# Patient Record
Sex: FEMALE | Race: WHITE | NOT HISPANIC OR LATINO | Employment: OTHER | URBAN - METROPOLITAN AREA
[De-identification: names, ages, dates, MRNs, and addresses within clinical notes are randomized per-mention and may not be internally consistent; named-entity substitution may affect disease eponyms.]

---

## 2017-02-15 ENCOUNTER — HISTORICAL (OUTPATIENT)
Dept: ADMINISTRATIVE | Facility: HOSPITAL | Age: 32
End: 2017-02-15

## 2017-02-15 LAB
ALBUMIN SERPL-MCNC: 4.3 G/DL (ref 3.1–4.7)
ALP SERPL-CCNC: 69 IU/L (ref 40–104)
ALT (SGPT): 28 IU/L (ref 3–33)
AST SERPL-CCNC: 21 IU/L (ref 10–40)
BASOPHILS NFR BLD: 0.1 K/UL (ref 0–0.2)
BASOPHILS NFR BLD: 0.6 %
BILIRUB SERPL-MCNC: 0.6 MG/DL (ref 0.3–1)
BUN SERPL-MCNC: 14 MG/DL (ref 8–20)
CALCIUM SERPL-MCNC: 9.2 MG/DL (ref 7.7–10.4)
CHLORIDE: 102 MMOL/L (ref 98–110)
CO2 SERPL-SCNC: 25.3 MMOL/L (ref 22.8–31.6)
CREATININE: 0.78 MG/DL (ref 0.6–1.4)
CRP SERPL-MCNC: 0.36 MG/DL (ref 0–1.4)
EOSINOPHIL NFR BLD: 0.1 K/UL (ref 0–0.7)
EOSINOPHIL NFR BLD: 1 %
ERYTHROCYTE [DISTWIDTH] IN BLOOD BY AUTOMATED COUNT: 12.8 % (ref 11.7–14.9)
GLUCOSE: 104 MG/DL (ref 70–99)
GRAN #: 4.8 K/UL (ref 1.4–6.5)
GRAN%: 58.8 %
HCT VFR BLD AUTO: 42 % (ref 36–48)
HGB BLD-MCNC: 14.1 G/DL (ref 12–15)
IMMATURE GRANS (ABS): 0 K/UL (ref 0–1)
IMMATURE GRANULOCYTES: 0.5 %
LYMPH #: 2.8 K/UL (ref 1.2–3.4)
LYMPH%: 33.6 %
MCH RBC QN AUTO: 31.6 PG (ref 25–35)
MCHC RBC AUTO-ENTMCNC: 33.6 G/DL (ref 31–36)
MCV RBC AUTO: 94.2 FL (ref 79–98)
MONO #: 0.5 K/UL (ref 0.1–0.6)
MONO%: 5.5 %
NUCLEATED RBCS: 0 %
PLATELET # BLD AUTO: 229 K/UL (ref 140–440)
PMV BLD AUTO: 12.1 FL (ref 8.8–12.7)
POTASSIUM SERPL-SCNC: 3.7 MMOL/L (ref 3.5–5)
PROT SERPL-MCNC: 7.5 G/DL (ref 6–8.2)
RBC # BLD AUTO: 4.46 M/UL (ref 3.5–5.5)
SODIUM: 138 MMOL/L (ref 134–144)
TSH SERPL DL<=0.005 MIU/L-ACNC: 2.46 ULU/ML (ref 0.3–5.6)
VITAMIN D, 1,25 (OH)2: 15.9 NG/ML (ref 30–100)
WBC # BLD AUTO: 8.2 K/UL (ref 5–10)

## 2017-07-24 DIAGNOSIS — M79.7 FIBROMYALGIA: ICD-10-CM

## 2017-07-24 DIAGNOSIS — Z79.899 ENCOUNTER FOR LONG-TERM (CURRENT) USE OF OTHER MEDICATIONS: Primary | ICD-10-CM

## 2017-10-02 ENCOUNTER — OFFICE VISIT (OUTPATIENT)
Dept: RHEUMATOLOGY | Facility: CLINIC | Age: 32
End: 2017-10-02
Payer: COMMERCIAL

## 2017-10-02 VITALS
HEIGHT: 64 IN | BODY MASS INDEX: 44.98 KG/M2 | WEIGHT: 263.5 LBS | SYSTOLIC BLOOD PRESSURE: 122 MMHG | DIASTOLIC BLOOD PRESSURE: 78 MMHG

## 2017-10-02 DIAGNOSIS — M79.7 FIBROMYALGIA: Primary | ICD-10-CM

## 2017-10-02 DIAGNOSIS — R30.0 DYSURIA: ICD-10-CM

## 2017-10-02 DIAGNOSIS — R76.8 POSITIVE ANA (ANTINUCLEAR ANTIBODY): ICD-10-CM

## 2017-10-02 PROCEDURE — 3074F SYST BP LT 130 MM HG: CPT | Mod: ,,, | Performed by: INTERNAL MEDICINE

## 2017-10-02 PROCEDURE — 3008F BODY MASS INDEX DOCD: CPT | Mod: ,,, | Performed by: INTERNAL MEDICINE

## 2017-10-02 PROCEDURE — 3078F DIAST BP <80 MM HG: CPT | Mod: ,,, | Performed by: INTERNAL MEDICINE

## 2017-10-02 PROCEDURE — 99213 OFFICE O/P EST LOW 20 MIN: CPT | Mod: ,,, | Performed by: INTERNAL MEDICINE

## 2017-10-02 RX ORDER — PREGABALIN 100 MG/1
CAPSULE ORAL
Refills: 2 | COMMUNITY
Start: 2017-07-11 | End: 2018-06-27

## 2017-10-02 RX ORDER — PREGABALIN 50 MG/1
CAPSULE ORAL
Refills: 3 | COMMUNITY
Start: 2017-07-11 | End: 2018-06-27

## 2017-10-02 RX ORDER — CYCLOBENZAPRINE HCL 10 MG
TABLET ORAL
COMMUNITY
End: 2019-12-02 | Stop reason: SDUPTHER

## 2017-10-02 RX ORDER — PREGABALIN 150 MG/1
150 CAPSULE ORAL
Qty: 30 CAPSULE | Refills: 3 | Status: SHIPPED | OUTPATIENT
Start: 2017-10-02 | End: 2018-01-04 | Stop reason: SDUPTHER

## 2017-10-02 NOTE — PROGRESS NOTES
"       [facility] RHEUMATOLOGY            PROGRESS NOTE      Subjective:       Patient ID:   NAME: Janice Hartmann : 1985     32 y.o. female    Referring Doc: No ref. provider found  Other Physicians:    Chief Complaint:  Fibromyalgia and Pain (10 generalized pain)      History of Present Illness:     Patient returns today for a regularly scheduled follow-up visit for FM      The patient is doing well except for dysuria  Irregular menses  No CP,no cough or SOB            ROS:   GEN:  No  fever, night sweats . weight is stable   Some fatigue  SKIN: no rashes, no bruising, no ulcerations, no Raynaud's  HEENT: no HA's, No visual changes, no mucosal ulcers, no sicca symptoms,  CV:   no CP, SOB, PND, ORTEGA, no orthopnea, no palpitations  PULM: normal with no SOB, cough, hemoptysis, sputum or pleuritic pain  GI:  no abdominal pain, nausea, vomiting, constipation, diarrhea, melanotic stools, BRBPR, hematemesis, no dysphagia  :   no dysuria  NEURO: no paresthesias, headaches, visual disturbances, muscle weakness  MUSCULOSKELETAL:no joint swelling, prolonged AM stiffness, no back pain, + muscle pain  Allergies:  Review of patient's allergies indicates:  No Known Allergies    Medications:    Current Outpatient Prescriptions:     cyclobenzaprine (FLEXERIL) 10 MG tablet, Take by mouth., Disp: , Rfl:     LYRICA 100 mg capsule, TK 2 CS PO BID, Disp: , Rfl: 2    LYRICA 50 mg capsule, TK ONE C PO QPM, Disp: , Rfl: 3    PMHx/PSHx Updates:    Objective:     Vitals:  Blood pressure 122/78, height 5' 4" (1.626 m), weight 119.5 kg (263 lb 8 oz), unknown if currently breastfeeding.    Physical Examination:   GEN: no apparent distress, comfortable; AAOx3  SKIN: no rashes,no ulceration, no Raynaud's, no petechiae, no SQ nodules,  HEAD: normal  EYES: no pallor, no icterus,   NECK: no masses, thyroid normal, trachea midline, no LAD/LN's, supple  CV: RRR with no murmur; l S1 and S2 reg. ,no gallop no rubs,   CHEST: Normal respiratory " effort; CTAB; normal breath sounds; no wheeze or crackles  MUSC/Skeletal: ROM normal; no crepitus; joints without synovitis,  no deformities  No joint swelling or tenderness of PIP, MCP, wrist, elbow, shoulder, or knee joints  EXTREM: no clubbing, cyanosis, no edema,normal  pulses   NEURO: grossly intact; motor WNL; AAOx3; ,   PSYCH: normal mood, affect and behavior  LYMPH: normal cervical, supraclavicular          Labs:   Lab Results   Component Value Date    WBC 8.2 02/15/2017    HGB 14.1 02/15/2017    HCT 42.0 02/15/2017    MCV 94.2 02/15/2017     02/15/2017    CMP  @LASTLAB(NA,K,CL,CO2,GLU,BUN,Creatinine,Calcium,PROT,Albumin,Bilitot,Alkphos,AST,ALT,CRP,ESR,RF,CCP,RITESH,SSA,CPK,uric acid) )@  I have reviewed all available lab results and radiology reports.    Radiology/Diagnostic Studies:        Assessment/Plan:   (1) 32 y.o. female with diagnosis of FM  Positive RITESH 1;640 with neg profile  Antiphospholipids ,complements are pending                Discussion:     I have explained all of the above in detail and the patient understands all of the current recommendation(s). I have answered all questions to the best of my ability and to their complete satisfaction.       The patient is to continue with the current management plan         RTC in 3 months        Electronically signed by Fidel Anne MD

## 2017-10-10 LAB
APPEARANCE UR: ABNORMAL
B2 GLYCOPROT1 IGA SER-ACNC: <9 SAU
B2 GLYCOPROT1 IGG SER-ACNC: <9 SGU
B2 GLYCOPROT1 IGM SER-ACNC: <9 SMU
BACTERIA #/AREA URNS HPF: ABNORMAL /HPF
BACTERIA UR CULT: NORMAL
BACTERIA UR CULT: NORMAL
BILIRUB UR QL STRIP: NEGATIVE
C3 SERPL-MCNC: 186 MG/DL (ref 90–180)
C4 SERPL-MCNC: 37 MG/DL (ref 16–47)
CARDIOLIPIN IGA SER IA-ACNC: <11 APL
CARDIOLIPIN IGG SER IA-ACNC: <14 GPL
CARDIOLIPIN IGM SER IA-ACNC: <12 MPL
CCP IGG SERPL-ACNC: <16 UNITS
COLOR UR: YELLOW
GLUCOSE UR QL STRIP: NEGATIVE
HGB UR QL STRIP: NEGATIVE
HYALINE CASTS #/AREA URNS LPF: ABNORMAL /LPF
INTERPRETATION: NORMAL
KETONES UR QL STRIP: NEGATIVE
LEUKOCYTE ESTERASE UR QL STRIP: ABNORMAL
NITRITE UR QL STRIP: NEGATIVE
PH UR STRIP: 5.5 [PH] (ref 5–8)
PROT UR QL STRIP: NEGATIVE
PS IGA SER-ACNC: <20 U/ML
PS IGG SER-ACNC: <10 U/ML
PS IGM SER-ACNC: <25 U/ML
RBC #/AREA URNS HPF: ABNORMAL /HPF
RHEUMATOID FACT SERPL-ACNC: <14 IU/ML
SP GR UR STRIP: 1.02 (ref 1–1.03)
SQUAMOUS #/AREA URNS HPF: ABNORMAL /HPF
THYROGLOB AB SERPL-ACNC: <1 IU/ML
THYROGLOB SERPL-MCNC: 35.3 NG/ML
WBC #/AREA URNS HPF: ABNORMAL /HPF

## 2018-01-04 ENCOUNTER — OFFICE VISIT (OUTPATIENT)
Dept: RHEUMATOLOGY | Facility: CLINIC | Age: 33
End: 2018-01-04
Payer: COMMERCIAL

## 2018-01-04 VITALS — WEIGHT: 256.5 LBS | SYSTOLIC BLOOD PRESSURE: 132 MMHG | DIASTOLIC BLOOD PRESSURE: 80 MMHG | BODY MASS INDEX: 44.03 KG/M2

## 2018-01-04 DIAGNOSIS — R76.8 POSITIVE ANA (ANTINUCLEAR ANTIBODY): Primary | ICD-10-CM

## 2018-01-04 PROCEDURE — 99213 OFFICE O/P EST LOW 20 MIN: CPT | Mod: ,,, | Performed by: INTERNAL MEDICINE

## 2018-01-04 RX ORDER — PREGABALIN 150 MG/1
150 CAPSULE ORAL
Qty: 30 CAPSULE | Refills: 3 | Status: SHIPPED | OUTPATIENT
Start: 2018-01-04 | End: 2018-06-27 | Stop reason: SDUPTHER

## 2018-01-04 NOTE — PROGRESS NOTES
[facility] RHEUMATOLOGY            PROGRESS NOTE      Subjective:       Patient ID:   NAME: Janice Hartmann : 1985     32 y.o. female    Referring Doc: No ref. provider found  Other Physicians:    Chief Complaint:  Fibromyalgia      History of Present Illness:     Patient returns today for a regularly scheduled follow-up visit for  Fibromyalgia and positive RITESH.     The patient is doing well. Occasional arthralgias and myalgias. No joint swelling. No fevers no rashes. No photosensitivity. No mucosal ulcerations. No chest pains ,cough or shortness of breath.  No Raynaud's      ROS:   GEN:  No  fever, night sweats . weight is stable   some fatigue  SKIN: no rashes, no bruising, no ulcerations, no Raynaud's  HEENT: no HA's, No visual changes, no mucosal ulcers, no sicca symptoms,  CV:   no CP, SOB, PND, ORTEGA, no orthopnea, no palpitations  PULM: normal with no SOB, cough, hemoptysis, sputum or pleuritic pain  GI:  no abdominal pain, nausea, vomiting, constipation, diarrhea, melanotic stools, BRBPR, hematemesis, no dysphagia  :   no dysuria  NEURO: no paresthesias, headaches, visual disturbances, muscle weakness  MUSCULOSKELETAL:no joint swelling, prolonged AM stiffness, no back pain, no muscle pain  Allergies:  Review of patient's allergies indicates:  No Known Allergies    Medications:    Current Outpatient Prescriptions:     cyclobenzaprine (FLEXERIL) 10 MG tablet, Take by mouth., Disp: , Rfl:     LYRICA 100 mg capsule, TK 2 CS PO BID, Disp: , Rfl: 2    LYRICA 50 mg capsule, TK ONE C PO QPM, Disp: , Rfl: 3    pregabalin (LYRICA) 150 MG capsule, Take 1 capsule (150 mg total) by mouth after dinner., Disp: 30 capsule, Rfl: 3    PMHx/PSHx Updates:          Objective:     Vitals:  Blood pressure 132/80, weight 116.3 kg (256 lb 8 oz), last menstrual period 2017, unknown if currently breastfeeding.    Physical Examination:   GEN: no apparent distress, comfortable; AAOx3  SKIN: no rashes,no ulceration,  no Raynaud's, no petechiae, no SQ nodules,  HEAD: normal  EYES: no pallor, no icterus  ENT:  ,no mucosal dryness or ulcerations  NECK: no masses, thyroid normal, trachea midline, no LAD/LN's, supple  CV: RRR with no murmur; l S1 and S2 reg. ,no gallop no rubs,   CHEST: Normal respiratory effort; CTAB; normal breath sounds; no wheeze or crackles  MUSC/Skeletal: ROM normal; no crepitus; joints without synovitis,  no deformities  No joint swelling or tenderness of PIP, MCP, wrist, elbow, shoulder, or knee joints  EXTREM: no clubbing, cyanosis, no edema,normal  pulses   NEURO: grossly intact; motor WNL; AAOx3; ,  PSYCH: normal mood, affect and behavior  LYMPH: normal cervical, supraclavicular          Labs:   Lab Results   Component Value Date    WBC 8.2 02/15/2017    HGB 14.1 02/15/2017    HCT 42.0 02/15/2017    MCV 94.2 02/15/2017     02/15/2017    CMP  @LASTLAB(NA,K,CL,CO2,GLU,BUN,Creatinine,Calcium,PROT,Albumin,Bilitot,Alkphos,AST,ALT,CRP,ESR,RF,CCP,RITESH,SSA,CPK,uric acid) )@  I have reviewed all available lab results and radiology reports.    Radiology/Diagnostic Studies:        Assessment/Plan:   (1) 32 y.o. female with diagnosis of Fibromyalgia.  2) Positive RITESH. Patient remains asymptomatic.    CBC CMP and urinalysis and RITESH profile.            Discussion:     I have explained all of the above in detail and the patient understands all of the current recommendation(s). I have answered all questions to the best of my ability and to their complete satisfaction.       The patient is to continue with the current management plan         RTC in 4 months or before when necessary        Electronically signed by Fidel Anne MD

## 2018-02-28 ENCOUNTER — TELEPHONE (OUTPATIENT)
Dept: INTERNAL MEDICINE | Facility: CLINIC | Age: 33
End: 2018-02-28

## 2018-02-28 DIAGNOSIS — G47.33 OSA ON CPAP: ICD-10-CM

## 2018-02-28 NOTE — TELEPHONE ENCOUNTER
----- Message from Irina Coelho LPN sent at 2/28/2018  7:11 AM CST -----  Contact: Maicol from [facility]  Hi Dr. Dayana Larson did order the study but  is not monitoring the patients CPAP usage. The info you see in the chart is correspondence that is sent to us and is just scanned in.     ----- Message -----  From: Marsha Livingston LPN  Sent: 2/27/2018   4:40 PM  To: Dayana Parra Staff    Dr Story is this patient's PCP, but it looks like from her chart that Dr. Anne ordered the sleep study and has been monitoring her CPAP usage.  Please advise.    Thank you.    ----- Message -----  From: Amanda Mercer  Sent: 2/27/2018   4:02 PM  To: Porsha Lamb Staff    Pt needs a new rx increasing the range on her cpap to 10-20 cm  Call back at [phone]

## 2018-03-01 NOTE — TELEPHONE ENCOUNTER
----- Message from Amanda Mercer sent at 2/28/2018 12:11 PM CST -----  Contact: Maicol  I put a msg in yesterday stating that the pt needs a new rx increasing the APAP range for 10-20 cm. Call Maicol at [phone] with any questions or fax the new rx to [phone]

## 2018-03-06 PROBLEM — G47.33 OSA ON CPAP: Status: ACTIVE | Noted: 2018-03-06

## 2018-06-27 ENCOUNTER — OFFICE VISIT (OUTPATIENT)
Dept: RHEUMATOLOGY | Facility: CLINIC | Age: 33
End: 2018-06-27
Payer: COMMERCIAL

## 2018-06-27 VITALS — WEIGHT: 251.13 LBS | BODY MASS INDEX: 43.1 KG/M2 | SYSTOLIC BLOOD PRESSURE: 126 MMHG | DIASTOLIC BLOOD PRESSURE: 88 MMHG

## 2018-06-27 DIAGNOSIS — R10.84 GENERALIZED ABDOMINAL PAIN: ICD-10-CM

## 2018-06-27 DIAGNOSIS — R10.9 ABDOMINAL PAIN, UNSPECIFIED ABDOMINAL LOCATION: ICD-10-CM

## 2018-06-27 DIAGNOSIS — R76.8 POSITIVE ANA (ANTINUCLEAR ANTIBODY): Primary | ICD-10-CM

## 2018-06-27 DIAGNOSIS — M79.7 FIBROMYALGIA: ICD-10-CM

## 2018-06-27 PROCEDURE — 99213 OFFICE O/P EST LOW 20 MIN: CPT | Mod: ,,, | Performed by: INTERNAL MEDICINE

## 2018-06-27 PROCEDURE — 3008F BODY MASS INDEX DOCD: CPT | Mod: ,,, | Performed by: INTERNAL MEDICINE

## 2018-06-27 RX ORDER — PREGABALIN 150 MG/1
150 CAPSULE ORAL
Qty: 30 CAPSULE | Refills: 3 | Status: SHIPPED | OUTPATIENT
Start: 2018-06-27 | End: 2018-11-14 | Stop reason: SDUPTHER

## 2018-06-27 NOTE — PROGRESS NOTES
[facility] RHEUMATOLOGY            PROGRESS NOTE      Subjective:       Patient ID:   NAME: Janice Hartmann : 1985     33 y.o. female    Referring Doc: No ref. provider found  Other Physicians:    Chief Complaint:  Positive RITESH (Needs refill on Lyrica)      History of Present Illness:     Patient returns today for a regularly scheduled follow-up visit for Positive RITESH( 1;640) and fibromyalgia      The patient denies any fevers, skin rashes or joint swelling. No chest pains cough or shortness of breath. She has been experiencing pain on the right lower quadrant for the past few days. Pain can be severe. No vomiting or diarrhea. No dysuria. Pain is intermittent, aggravated with certain activities like twisting  her torso or bending over.  No back pain or paresthesias.  No mucosal ulcerations. No sicca symptoms.        ROS:   GEN:  No  fever, night sweats . weight is stable   No fatigue  SKIN: no rashes, no bruising, no ulcerations, no Raynaud's  HEENT: no HA's, No visual changes, no mucosal ulcers, no sicca symptoms,  CV:   no CP, SOB, PND, ORTEGA, no orthopnea, no palpitations  PULM: normal with no SOB, cough, hemoptysis, sputum or pleuritic pain  GI:  + abdominal pain,No  nausea, vomiting, constipation, diarrhea, melanotic stools, BRBPR, hematemesis, no dysphagia  :   no dysuria  NEURO: no paresthesias, headaches, visual disturbances, muscle weakness  MUSCULOSKELETAL:no joint swelling, prolonged AM stiffness, no back pain, no muscle pain  Allergies:  Review of patient's allergies indicates:  No Known Allergies    Medications:    Current Outpatient Prescriptions:     cyclobenzaprine (FLEXERIL) 10 MG tablet, Take by mouth., Disp: , Rfl:     LYRICA 100 mg capsule, TK 2 CS PO BID, Disp: , Rfl: 2    LYRICA 50 mg capsule, TK ONE C PO QPM, Disp: , Rfl: 3    pregabalin (LYRICA) 150 MG capsule, Take 1 capsule (150 mg total) by mouth after dinner., Disp: 30 capsule, Rfl: 3    PMHx/PSHx  Updates:          Objective:     Vitals:  Blood pressure 126/88, weight 113.9 kg (251 lb 1.6 oz), unknown if currently breastfeeding.    Physical Examination:   GEN: no apparent distress, comfortable; AAOx3  SKIN: no rashes,no ulceration, no Raynaud's, no petechiae, no SQ nodules,  HEAD: normal  EYES: no pallor, no icterus  NECK: no masses, thyroid normal, trachea midline, no LAD/LN's, supple  CV: RRR with no murmur; l S1 and S2 reg. ,no gallop no rubs,   CHEST: Normal respiratory effort; CTAB; normal breath sounds; no wheeze or crackles  ABDOM: nondistended; soft; no masses; no rebound/guarding. Tenderness on deep palpation in right lower quadrant. No guarding or rebound.  MUSC/Skeletal: ROM normal; no crepitus; joints without synovitis,  no deformities  No joint swelling or tenderness of PIP, MCP, wrist, elbow, shoulder, or knee joints  EXTREM: no clubbing, cyanosis, no edema,normal  pulses   NEURO: grossly intact; motor WNL; AAOx3;   PSYCH: normal mood, affect and behavior  LYMPH: normal cervical, supraclavicular          Labs:   Lab Results   Component Value Date    WBC 8.2 02/15/2017    HGB 14.1 02/15/2017    HCT 42.0 02/15/2017    MCV 94.2 02/15/2017     02/15/2017    CMP  @LASTLAB(NA,K,CL,CO2,GLU,BUN,Creatinine,Calcium,PROT,Albumin,Bilitot,Alkphos,AST,ALT,CRP,ESR,RF,CCP,RITESH,SSA,CPK,uric acid) )@  I have reviewed all available lab results and radiology reports.    Radiology/Diagnostic Studies:        Assessment/Plan:   (1) 33 y.o. female with diagnosis of Positive RITESH and fibromyalgia. These are stable  2) unclear etiology of abdominal pain.  PLAN: CT scan of the abdomen. She is to go to the emergency room if worsening of symptoms, fevers ,chills etc.      CBC CMP sedimentation rate urinalysis        Discussion:     I have explained all of the above in detail and the patient understands all of the current recommendation(s). I have answered all questions to the best of my ability and to their complete  satisfaction.       The patient is to continue with the current management plan         RTC in  A few months. Patient will go out of town next wk       Electronically signed by Fidel Anne MD

## 2018-11-14 ENCOUNTER — TELEPHONE (OUTPATIENT)
Dept: RHEUMATOLOGY | Facility: CLINIC | Age: 33
End: 2018-11-14

## 2018-11-14 DIAGNOSIS — M79.7 FIBROMYALGIA: Primary | ICD-10-CM

## 2018-11-14 RX ORDER — PREGABALIN 150 MG/1
150 CAPSULE ORAL
Qty: 30 CAPSULE | Refills: 0 | Status: SHIPPED | OUTPATIENT
Start: 2018-11-14 | End: 2018-12-31 | Stop reason: SDUPTHER

## 2018-11-14 NOTE — TELEPHONE ENCOUNTER
Pt asks for a 30 day supply of lyrica sent to [facility] in [place]. She has to travel for work, will leave Friday and return after Thanksgiving. States she will call and schedule an appt when she returns.

## 2018-12-31 DIAGNOSIS — M79.7 FIBROMYALGIA: ICD-10-CM

## 2018-12-31 RX ORDER — PREGABALIN 150 MG/1
150 CAPSULE ORAL
Qty: 30 CAPSULE | Refills: 0 | Status: SHIPPED | OUTPATIENT
Start: 2018-12-31 | End: 2019-02-11 | Stop reason: SDUPTHER

## 2019-02-11 ENCOUNTER — OFFICE VISIT (OUTPATIENT)
Dept: RHEUMATOLOGY | Facility: CLINIC | Age: 34
End: 2019-02-11
Payer: COMMERCIAL

## 2019-02-11 VITALS — BODY MASS INDEX: 43.99 KG/M2 | SYSTOLIC BLOOD PRESSURE: 91 MMHG | WEIGHT: 256.31 LBS | DIASTOLIC BLOOD PRESSURE: 51 MMHG

## 2019-02-11 DIAGNOSIS — R76.8 POSITIVE ANA (ANTINUCLEAR ANTIBODY): Primary | ICD-10-CM

## 2019-02-11 DIAGNOSIS — M79.7 FIBROMYALGIA: ICD-10-CM

## 2019-02-11 PROCEDURE — 99213 PR OFFICE/OUTPT VISIT, EST, LEVL III, 20-29 MIN: ICD-10-PCS | Mod: ,,, | Performed by: INTERNAL MEDICINE

## 2019-02-11 PROCEDURE — 99213 OFFICE O/P EST LOW 20 MIN: CPT | Mod: ,,, | Performed by: INTERNAL MEDICINE

## 2019-02-11 RX ORDER — PREGABALIN 150 MG/1
150 CAPSULE ORAL
Qty: 30 CAPSULE | Refills: 3 | Status: SHIPPED | OUTPATIENT
Start: 2019-02-11 | End: 2019-07-25 | Stop reason: SDUPTHER

## 2019-02-11 NOTE — PROGRESS NOTES
[facility] RHEUMATOLOGY            PROGRESS NOTE      Subjective:       Patient ID:   NAME: Janice Hartmann : 1985     33 y.o. female    Referring Doc: No ref. provider found  Other Physicians:    Chief Complaint:  Positive RITESH      History of Present Illness:     Patient returns today for a regularly scheduled follow-up visit for positive RITESH and fibromyalgia      The patient is doing well. Denies fatigue rashes photosensitivity. No Raynaud's. No chest pains cough or shortness of breath. No joint swelling or significant arthralgias or myalgias            ROS:   GEN:  No  fever, night sweats . weight is stable   No fatigue  SKIN: no rashes, no bruising, no ulcerations, no Raynaud's  HEENT: no HA's, No visual changes, no mucosal ulcers, no sicca symptoms,  CV:   no CP, SOB, PND, ORTEGA, no orthopnea, no palpitations  PULM: normal with no SOB, cough, hemoptysis, sputum or pleuritic pain  GI:  no abdominal pain, nausea, vomiting, constipation, diarrhea, melanotic stools, BRBPR, hematemesis, no dysphagia  :   no dysuria  NEURO: no paresthesias, headaches, visual disturbances, muscle weakness  MUSCULOSKELETAL:no joint swelling, prolonged AM stiffness, no back pain, no muscle pain  Allergies:  Review of patient's allergies indicates:  No Known Allergies    Medications:    Current Outpatient Medications:     cyclobenzaprine (FLEXERIL) 10 MG tablet, Take by mouth., Disp: , Rfl:     pregabalin (LYRICA) 150 MG capsule, Take 1 capsule (150 mg total) by mouth after dinner., Disp: 30 capsule, Rfl: 0    PMHx/PSHx Updates:        Objective:     Vitals:  Blood pressure (!) 91/51, weight 116.3 kg (256 lb 4.8 oz), unknown if currently breastfeeding.    Physical Examination:   GEN: no apparent distress, comfortable; AAOx3  SKIN: no rashes,no ulceration, no Raynaud's, no petechiae, no SQ nodules,  HEAD: normal  EYES: no pallor, no icterus,  NECK: no masses, thyroid normal, trachea midline, no LAD/LN's, supple  CV: RRR with  no murmur; l S1 and S2 reg. ,no gallop no rubs,   CHEST: Normal respiratory effort; CTAB; normal breath sounds; no wheeze or crackles  MUSC/Skeletal: ROM normal; no crepitus; joints without synovitis,  no deformities  No joint swelling or tenderness of PIP, MCP, wrist, elbow, shoulder, or knee joints  EXTREM: no clubbing, cyanosis, no edema,normal  pulses   NEURO: grossly intact; motor WNL; AAOx3;   PSYCH: normal mood, affect and behavior  LYMPH: normal cervical, supraclavicular          Labs:   Lab Results   Component Value Date    WBC 8.2 02/15/2017    HGB 14.1 02/15/2017    HCT 42.0 02/15/2017    MCV 94.2 02/15/2017     02/15/2017    CMP  @LASTLAB(NA,K,CL,CO2,GLU,BUN,Creatinine,Calcium,PROT,Albumin,Bilitot,Alkphos,AST,ALT,CRP,ESR,RF,CCP,RITESH,SSA,CPK,uric acid) )@  I have reviewed all available lab results and radiology reports.    Radiology/Diagnostic Studies:        Assessment/Plan:   (1) 33 y.o. female with diagnosis of positive RITESH. She has not done blood work as advised and prescribed.  She understands that the main purpose of the blood work is to monitor for any manifestation of lupus that might require therapy: hematologic  disorders ,renal disorders etc.          CBC CMP urinalysis        Discussion:     I have explained all of the above in detail and the patient understands all of the current recommendation(s). I have answered all questions to the best of my ability and to their complete satisfaction.       The patient is to continue with the current management plan         RTC in   4 months or before if needed      Electronically signed by Fidel Anne MD

## 2019-07-25 DIAGNOSIS — M79.7 FIBROMYALGIA: ICD-10-CM

## 2019-07-25 RX ORDER — PREGABALIN 150 MG/1
150 CAPSULE ORAL
Qty: 30 CAPSULE | Refills: 3 | Status: SHIPPED | OUTPATIENT
Start: 2019-07-25 | End: 2019-12-02 | Stop reason: SDUPTHER

## 2019-07-25 NOTE — TELEPHONE ENCOUNTER
Patient called requesting a refill on her Lyrica. Patient states she was suppose to come in soon but got called to go out of town on an unexpected work trip and will be out of Rx. Patient will call to schedule an appt when she gets back.

## 2019-07-26 DIAGNOSIS — M79.7 FIBROMYALGIA: ICD-10-CM

## 2019-08-01 RX ORDER — PREGABALIN 150 MG/1
CAPSULE ORAL
Qty: 30 CAPSULE | Refills: 3 | OUTPATIENT
Start: 2019-08-01

## 2019-11-05 DIAGNOSIS — M79.7 FIBROMYALGIA: ICD-10-CM

## 2019-11-05 RX ORDER — PREGABALIN 150 MG/1
150 CAPSULE ORAL
Qty: 30 CAPSULE | Refills: 3 | OUTPATIENT
Start: 2019-11-05 | End: 2020-05-05

## 2019-12-02 ENCOUNTER — OFFICE VISIT (OUTPATIENT)
Dept: FAMILY MEDICINE | Facility: CLINIC | Age: 34
End: 2019-12-02
Payer: COMMERCIAL

## 2019-12-02 VITALS
DIASTOLIC BLOOD PRESSURE: 84 MMHG | HEART RATE: 83 BPM | WEIGHT: 275 LBS | RESPIRATION RATE: 18 BRPM | HEIGHT: 64 IN | OXYGEN SATURATION: 98 % | SYSTOLIC BLOOD PRESSURE: 130 MMHG | BODY MASS INDEX: 46.95 KG/M2

## 2019-12-02 DIAGNOSIS — F32.A ANXIETY AND DEPRESSION: ICD-10-CM

## 2019-12-02 DIAGNOSIS — Z12.39 BREAST CANCER SCREENING: ICD-10-CM

## 2019-12-02 DIAGNOSIS — Z13.1 SCREENING FOR DIABETES MELLITUS: ICD-10-CM

## 2019-12-02 DIAGNOSIS — Z13.220 ENCOUNTER FOR LIPID SCREENING FOR CARDIOVASCULAR DISEASE: ICD-10-CM

## 2019-12-02 DIAGNOSIS — F41.9 ANXIETY AND DEPRESSION: ICD-10-CM

## 2019-12-02 DIAGNOSIS — Z13.0 SCREENING FOR DEFICIENCY ANEMIA: ICD-10-CM

## 2019-12-02 DIAGNOSIS — M79.7 FIBROMYALGIA: Primary | ICD-10-CM

## 2019-12-02 DIAGNOSIS — M79.7 FIBROMYALGIA: ICD-10-CM

## 2019-12-02 DIAGNOSIS — Z13.6 ENCOUNTER FOR LIPID SCREENING FOR CARDIOVASCULAR DISEASE: ICD-10-CM

## 2019-12-02 DIAGNOSIS — Z80.3 FAMILY HISTORY OF BREAST CANCER: ICD-10-CM

## 2019-12-02 PROCEDURE — 3008F BODY MASS INDEX DOCD: CPT | Mod: S$GLB,,, | Performed by: NURSE PRACTITIONER

## 2019-12-02 PROCEDURE — 3008F PR BODY MASS INDEX (BMI) DOCUMENTED: ICD-10-PCS | Mod: S$GLB,,, | Performed by: NURSE PRACTITIONER

## 2019-12-02 PROCEDURE — 99204 PR OFFICE/OUTPT VISIT, NEW, LEVL IV, 45-59 MIN: ICD-10-PCS | Mod: S$GLB,,, | Performed by: NURSE PRACTITIONER

## 2019-12-02 PROCEDURE — 99204 OFFICE O/P NEW MOD 45 MIN: CPT | Mod: S$GLB,,, | Performed by: NURSE PRACTITIONER

## 2019-12-02 RX ORDER — PREGABALIN 150 MG/1
150 CAPSULE ORAL
Qty: 90 CAPSULE | Refills: 0 | Status: CANCELLED | OUTPATIENT
Start: 2019-12-02

## 2019-12-02 RX ORDER — CYCLOBENZAPRINE HCL 10 MG
10 TABLET ORAL
Qty: 30 TABLET | Refills: 2 | Status: SHIPPED | OUTPATIENT
Start: 2019-12-02 | End: 2020-06-02 | Stop reason: SDUPTHER

## 2019-12-02 RX ORDER — BUSPIRONE HYDROCHLORIDE 7.5 MG/1
7.5 TABLET ORAL 2 TIMES DAILY
Qty: 60 TABLET | Refills: 2 | Status: SHIPPED | OUTPATIENT
Start: 2019-12-02 | End: 2020-03-02 | Stop reason: SINTOL

## 2019-12-02 NOTE — PROGRESS NOTES
SUBJECTIVE:      Patient ID: Janice Hartmann is a 34 y.o. female.    Chief Complaint: Establish Care    Establishing care - referred to this provider by her , former PCP Dr. De Leon    Follows with Dr. Fidel Anne (rheum) - stable on Lyrica, admits she has good & bad days, states rheum told her she also has underlying lupus    Discussed outstanding health maintenance - Dr. Rivers for pap    Anxiety   Presents for initial visit. The problem has been unchanged. Symptoms include depressed mood, irritability, muscle tension and nervous/anxious behavior. Patient reports no chest pain, confusion, decreased concentration, dizziness, nausea, palpitations or shortness of breath. Symptoms occur most days. The severity of symptoms is mild. The symptoms are aggravated by work stress (responsibilities at home). The quality of sleep is good.     Her past medical history is significant for fibromyalgia. There is no history of anxiety/panic attacks, bipolar disorder or depression. Past treatments include nothing.       Past Surgical History:   Procedure Laterality Date    APPENDECTOMY       SECTION      preclampsia      x 2     Family History   Problem Relation Age of Onset    Hypertension Mother     Rheum arthritis Mother     Thyroid disease Mother     Fibromyalgia Mother     Alcohol abuse Father     Cancer Father     Diabetes Mellitus Father     Hypertension Father     Cancer Maternal Grandmother         breast    Cancer Maternal Grandfather     Cancer Maternal Aunt         breast      Social History     Socioeconomic History    Marital status:      Spouse name: Not on file    Number of children: 2    Years of education: Not on file    Highest education level: Not on file   Occupational History    Occupation: /mgr   Social Needs    Financial resource strain: Not on file    Food insecurity:     Worry: Not on file     Inability: Not on file    Transportation  needs:     Medical: Not on file     Non-medical: Not on file   Tobacco Use    Smoking status: Never Smoker    Smokeless tobacco: Never Used   Substance and Sexual Activity    Alcohol use: No    Drug use: No    Sexual activity: Yes     Partners: Male   Lifestyle    Physical activity:     Days per week: Not on file     Minutes per session: Not on file    Stress: Not on file   Relationships    Social connections:     Talks on phone: Not on file     Gets together: Not on file     Attends Amish service: Not on file     Active member of club or organization: Not on file     Attends meetings of clubs or organizations: Not on file     Relationship status: Not on file   Other Topics Concern    Not on file   Social History Narrative    Not on file     Current Outpatient Medications   Medication Sig Dispense Refill    cyclobenzaprine (FLEXERIL) 10 MG tablet Take 1 tablet (10 mg total) by mouth as needed for Muscle spasms. 30 tablet 2    busPIRone (BUSPAR) 7.5 MG tablet Take 1 tablet (7.5 mg total) by mouth 2 (two) times daily. 60 tablet 2    pregabalin (LYRICA) 150 MG capsule Take 1 capsule (150 mg total) by mouth after dinner. 90 capsule 0     No current facility-administered medications for this visit.      Review of patient's allergies indicates:  No Known Allergies   Past Medical History:   Diagnosis Date    Anxiety and depression 2019    Fibromyalgia 2012    Lupus     states per [place]    Severe preeclampsia 2015     Past Surgical History:   Procedure Laterality Date    APPENDECTOMY       SECTION      preclampsia      x 2       Review of Systems   Constitutional: Positive for irritability. Negative for activity change, appetite change, fatigue and unexpected weight change.   HENT: Negative for congestion, ear pain, hearing loss, postnasal drip, sinus pressure, sinus pain, sneezing and sore throat.    Eyes: Negative for photophobia and pain.   Respiratory: Negative for  "cough, chest tightness, shortness of breath and wheezing.         SONIA stable on CPAP   Cardiovascular: Negative for chest pain, palpitations and leg swelling.   Gastrointestinal: Negative for abdominal distention, abdominal pain, blood in stool, constipation, diarrhea, nausea and vomiting.   Endocrine: Negative for cold intolerance, heat intolerance, polydipsia and polyuria.   Genitourinary: Negative for difficulty urinating, dysuria, flank pain, frequency, hematuria, pelvic pain and urgency.   Musculoskeletal: Positive for arthralgias and myalgias. Negative for back pain, joint swelling and neck pain.        States fibro mostly affects upper extremities   Skin: Negative for pallor.   Allergic/Immunologic: Negative for environmental allergies and food allergies.   Neurological: Negative for dizziness, weakness, light-headedness and numbness.   Hematological: Does not bruise/bleed easily.   Psychiatric/Behavioral: Positive for depression. Negative for agitation, confusion, decreased concentration and sleep disturbance. The patient is nervous/anxious.       OBJECTIVE:      Vitals:    12/02/19 1547   BP: 130/84   Pulse: 83   Resp: 18   SpO2: 98%   Weight: 124.7 kg (275 lb)   Height: 5' 4" (1.626 m)     Physical Exam   Constitutional: She is oriented to person, place, and time. Vital signs are normal. She appears well-developed and well-nourished. No distress.   obese   HENT:   Head: Normocephalic and atraumatic.   Right Ear: Hearing normal.   Left Ear: Hearing normal.   Nose: Nose normal. No rhinorrhea.   Mouth/Throat: Mucous membranes are normal.   Eyes: Pupils are equal, round, and reactive to light. Conjunctivae and lids are normal. Right eye exhibits no discharge. Left eye exhibits no discharge. Right conjunctiva is not injected. Left conjunctiva is not injected. Right pupil is round and reactive. Left pupil is round and reactive. Pupils are equal.   Neck: Trachea normal and normal range of motion. Neck supple. No " JVD present. No tracheal deviation present. No thyromegaly present.   Cardiovascular: Normal rate, regular rhythm, normal heart sounds and intact distal pulses. Exam reveals no gallop and no friction rub.   No murmur heard.  Pulses:       Radial pulses are 2+ on the right side, and 2+ on the left side.   Pulmonary/Chest: Effort normal and breath sounds normal. No stridor. No respiratory distress. She has no decreased breath sounds. She has no wheezes. She has no rhonchi. She has no rales.   Abdominal: Soft. Bowel sounds are normal. She exhibits no distension. There is no tenderness. There is no rigidity and no guarding.   Musculoskeletal: Normal range of motion. She exhibits no edema.   Lymphadenopathy:     She has no cervical adenopathy.   Neurological: She is alert and oriented to person, place, and time. She has normal strength. She displays no atrophy. She displays a negative Romberg sign. Coordination and gait normal.   Skin: Skin is warm and dry. Capillary refill takes less than 2 seconds. No lesion and no rash noted. No cyanosis. No pallor.   Psychiatric: Her speech is normal and behavior is normal. Judgment and thought content normal. Her mood appears anxious. Cognition and memory are normal. She is attentive.   Nursing note and vitals reviewed.     Assessment:       1. Fibromyalgia    2. Anxiety and depression    3. Encounter for lipid screening for cardiovascular disease    4. Screening for diabetes mellitus    5. Screening for deficiency anemia    6. Breast cancer screening    7. Family history of breast cancer        Plan:       Fibromyalgia  -     cyclobenzaprine (FLEXERIL) 10 MG tablet; Take 1 tablet (10 mg total) by mouth as needed for Muscle spasms.  Dispense: 30 tablet; Refill: 2  - Lyrica refill requested through Dr. Story d/t scope of practice limitations    Anxiety and depression  -     busPIRone (BUSPAR) 7.5 MG tablet; Take 1 tablet (7.5 mg total) by mouth 2 (two) times daily.  Dispense: 60  tablet; Refill: 2    Encounter for lipid screening for cardiovascular disease  -     Lipid panel; Future; Expected date: 12/02/2019    Screening for diabetes mellitus  -     Comprehensive metabolic panel; Future; Expected date: 12/02/2019    Screening for deficiency anemia  -     CBC auto differential; Future; Expected date: 12/09/2019    Breast cancer screening  -     Mammo Digital Diagnostic Bilat w/ [facility]; Future; Expected date: 12/02/2019    Family history of breast cancer  -     Mammo Digital Diagnostic Bilat w/ [facility]; Future; Expected date: 12/02/2019        Follow up in about 4 weeks (around 12/30/2019) for lab review & anxiety recheck.      12/3/2019 Celine Obrien, VIANNEY, FNP-C

## 2019-12-03 ENCOUNTER — TELEPHONE (OUTPATIENT)
Dept: FAMILY MEDICINE | Facility: CLINIC | Age: 34
End: 2019-12-03

## 2019-12-03 DIAGNOSIS — Z12.31 ENCOUNTER FOR SCREENING MAMMOGRAM FOR BREAST CANCER: ICD-10-CM

## 2019-12-03 DIAGNOSIS — Z80.3 FAMILY HISTORY OF BREAST CANCER: Primary | ICD-10-CM

## 2019-12-03 RX ORDER — PREGABALIN 150 MG/1
150 CAPSULE ORAL
Qty: 90 CAPSULE | Refills: 0 | Status: SHIPPED | OUTPATIENT
Start: 2019-12-03 | End: 2020-03-02

## 2019-12-04 NOTE — TELEPHONE ENCOUNTER
You ordered a Mammo Digital DIAGNOSTIC Bilat with[facility]. With any diagnostic mammo the radiologist requires an U/S be ordered so if they need it they can get it done at that time. Also Z12.39 is not a payable code. it should be Z12.31 and it is ok to leave the family history code as secondary code.

## 2020-02-13 ENCOUNTER — HOSPITAL ENCOUNTER (OUTPATIENT)
Dept: RADIOLOGY | Facility: HOSPITAL | Age: 35
Discharge: HOME | End: 2020-02-13
Attending: NURSE PRACTITIONER
Payer: COMMERCIAL

## 2020-02-13 VITALS — BODY MASS INDEX: 46.67 KG/M2 | HEIGHT: 64 IN | WEIGHT: 273.38 LBS

## 2020-02-13 DIAGNOSIS — Z12.31 ENCOUNTER FOR SCREENING MAMMOGRAM FOR BREAST CANCER: ICD-10-CM

## 2020-02-13 DIAGNOSIS — Z80.3 FAMILY HISTORY OF BREAST CANCER: ICD-10-CM

## 2020-02-13 PROCEDURE — 77067 SCR MAMMO BI INCL CAD: CPT | Mod: TC,PO

## 2020-02-14 ENCOUNTER — TELEPHONE (OUTPATIENT)
Dept: FAMILY MEDICINE | Facility: CLINIC | Age: 35
End: 2020-02-14

## 2020-02-14 NOTE — TELEPHONE ENCOUNTER
----- Message from VIANNEY Francisco,FNP-C sent at 2/13/2020  9:36 AM CST -----  Mammogram fine - repeat in 1 year

## 2020-02-27 ENCOUNTER — PATIENT MESSAGE (OUTPATIENT)
Dept: FAMILY MEDICINE | Facility: CLINIC | Age: 35
End: 2020-02-27

## 2020-02-27 RX ORDER — PREGABALIN 150 MG/1
CAPSULE ORAL
COMMUNITY
Start: 2020-02-10 | End: 2020-03-02 | Stop reason: SDUPTHER

## 2020-02-27 RX ORDER — BUSPIRONE HYDROCHLORIDE 10 MG/1
7.5 TABLET ORAL
COMMUNITY
Start: 2020-01-11 | End: 2020-03-02

## 2020-03-01 DIAGNOSIS — M79.7 FIBROMYALGIA: ICD-10-CM

## 2020-03-02 ENCOUNTER — OFFICE VISIT (OUTPATIENT)
Dept: FAMILY MEDICINE | Facility: CLINIC | Age: 35
End: 2020-03-02
Payer: COMMERCIAL

## 2020-03-02 VITALS
SYSTOLIC BLOOD PRESSURE: 124 MMHG | BODY MASS INDEX: 46.95 KG/M2 | WEIGHT: 275 LBS | HEIGHT: 64 IN | OXYGEN SATURATION: 99 % | DIASTOLIC BLOOD PRESSURE: 74 MMHG | HEART RATE: 81 BPM | TEMPERATURE: 98 F

## 2020-03-02 DIAGNOSIS — F32.A ANXIETY AND DEPRESSION: Primary | ICD-10-CM

## 2020-03-02 DIAGNOSIS — F41.9 ANXIETY AND DEPRESSION: Primary | ICD-10-CM

## 2020-03-02 DIAGNOSIS — M79.7 FIBROMYALGIA: Primary | ICD-10-CM

## 2020-03-02 PROCEDURE — 3008F PR BODY MASS INDEX (BMI) DOCUMENTED: ICD-10-PCS | Mod: S$GLB,,, | Performed by: NURSE PRACTITIONER

## 2020-03-02 PROCEDURE — 99213 PR OFFICE/OUTPT VISIT, EST, LEVL III, 20-29 MIN: ICD-10-PCS | Mod: S$GLB,,, | Performed by: NURSE PRACTITIONER

## 2020-03-02 PROCEDURE — 99213 OFFICE O/P EST LOW 20 MIN: CPT | Mod: S$GLB,,, | Performed by: NURSE PRACTITIONER

## 2020-03-02 PROCEDURE — 3008F BODY MASS INDEX DOCD: CPT | Mod: S$GLB,,, | Performed by: NURSE PRACTITIONER

## 2020-03-02 RX ORDER — PREGABALIN 150 MG/1
CAPSULE ORAL
Qty: 90 CAPSULE | Refills: 0 | Status: SHIPPED | OUTPATIENT
Start: 2020-03-02 | End: 2020-03-02

## 2020-03-02 RX ORDER — MIRTAZAPINE 15 MG/1
15 TABLET, FILM COATED ORAL NIGHTLY
Qty: 30 TABLET | Refills: 11 | Status: SHIPPED | OUTPATIENT
Start: 2020-03-02 | End: 2020-06-02

## 2020-03-02 RX ORDER — HYDROXYZINE HYDROCHLORIDE 50 MG/1
50 TABLET, FILM COATED ORAL 4 TIMES DAILY PRN
Qty: 120 TABLET | Refills: 5 | Status: SHIPPED | OUTPATIENT
Start: 2020-03-02 | End: 2020-03-02 | Stop reason: ALTCHOICE

## 2020-03-03 NOTE — PROGRESS NOTES
"    SUBJECTIVE:      Patient ID: Janice Hartmann is a 34 y.o. female.    Chief Complaint: Depression and Anxiety    Follow-up for anxiety recheck - states she did not like the way she felt on BuSpar, tried taking half dose and still felt odd so she discontinued med, has been busy with work/kids/home responsibilities and has not "made time" to come back for recheck or have labs drawn    Anxiety   Presents for follow-up visit. The problem has been unchanged. Symptoms include depressed mood, excessive worry, insomnia, irritability, muscle tension and nervous/anxious behavior. Patient reports no chest pain, confusion, decreased concentration, dizziness, nausea, palpitations or shortness of breath. Symptoms occur most days. The severity of symptoms is mild. The symptoms are aggravated by work stress (responsibilities at home). The quality of sleep is fair.     Her past medical history is significant for fibromyalgia. There is no history of anxiety/panic attacks, bipolar disorder or depression. Past treatments include nothing.       Past Surgical History:   Procedure Laterality Date    APPENDECTOMY       SECTION      preclampsia      x 2     Family History   Problem Relation Age of Onset    Hypertension Mother     Rheum arthritis Mother     Thyroid disease Mother     Fibromyalgia Mother     Alcohol abuse Father     Cancer Father     Diabetes Mellitus Father     Hypertension Father     Cancer Maternal Grandmother         breast    Breast cancer Maternal Grandmother     Cancer Maternal Grandfather     Cancer Maternal Aunt         breast    Breast cancer Maternal Aunt     Breast cancer Paternal Grandmother       Social History     Socioeconomic History    Marital status:      Spouse name: Not on file    Number of children: 2    Years of education: Not on file    Highest education level: Not on file   Occupational History    Occupation: /mgr   Social Needs    Financial " resource strain: Not on file    Food insecurity:     Worry: Not on file     Inability: Not on file    Transportation needs:     Medical: Not on file     Non-medical: Not on file   Tobacco Use    Smoking status: Never Smoker    Smokeless tobacco: Never Used   Substance and Sexual Activity    Alcohol use: No    Drug use: No    Sexual activity: Yes     Partners: Male   Lifestyle    Physical activity:     Days per week: Not on file     Minutes per session: Not on file    Stress: Only a little   Relationships    Social connections:     Talks on phone: Not on file     Gets together: Not on file     Attends Sikh service: Not on file     Active member of club or organization: Not on file     Attends meetings of clubs or organizations: Not on file     Relationship status: Not on file   Other Topics Concern    Not on file   Social History Narrative    Not on file     Current Outpatient Medications   Medication Sig Dispense Refill    cyclobenzaprine (FLEXERIL) 10 MG tablet Take 1 tablet (10 mg total) by mouth as needed for Muscle spasms. 30 tablet 2    pregabalin (LYRICA) 150 MG capsule       mirtazapine (REMERON) 15 MG tablet Take 1 tablet (15 mg total) by mouth every evening. 30 tablet 11     No current facility-administered medications for this visit.      Review of patient's allergies indicates:  No Known Allergies   Past Medical History:   Diagnosis Date    Anxiety and depression 2019    Fibromyalgia 2012    Lupus     states per [place]    Severe preeclampsia 2015     Past Surgical History:   Procedure Laterality Date    APPENDECTOMY       SECTION      preclampsia      x 2       Review of Systems   Constitutional: Positive for irritability. Negative for activity change, appetite change, fatigue and unexpected weight change.   HENT: Negative for congestion, ear pain, hearing loss, postnasal drip, sinus pressure, sinus pain, sneezing and sore throat.    Eyes: Negative for  "photophobia and pain.   Respiratory: Negative for cough, chest tightness, shortness of breath and wheezing.         SONIA stable on CPAP   Cardiovascular: Negative for chest pain, palpitations and leg swelling.   Gastrointestinal: Negative for abdominal distention, abdominal pain, blood in stool, constipation, diarrhea, nausea and vomiting.   Endocrine: Negative for cold intolerance, heat intolerance, polydipsia and polyuria.   Genitourinary: Negative for difficulty urinating, dysuria, flank pain, frequency, hematuria, pelvic pain and urgency.   Musculoskeletal: Positive for arthralgias and myalgias. Negative for back pain, joint swelling and neck pain.        States fibro mostly affects upper extremities   Skin: Negative for pallor.   Allergic/Immunologic: Negative for environmental allergies and food allergies.   Neurological: Negative for dizziness, weakness, light-headedness and numbness.   Hematological: Does not bruise/bleed easily.   Psychiatric/Behavioral: Positive for depression and sleep disturbance. Negative for agitation, confusion and decreased concentration. The patient is nervous/anxious and has insomnia.       OBJECTIVE:      Vitals:    03/02/20 1555   BP: 124/74   Pulse: 81   Temp: 98.4 °F (36.9 °C)   SpO2: 99%   Weight: 124.7 kg (275 lb)   Height: 5' 4" (1.626 m)     Physical Exam   Constitutional: She is oriented to person, place, and time. Vital signs are normal. She appears well-developed and well-nourished. No distress.   obese   HENT:   Head: Normocephalic and atraumatic.   Right Ear: Hearing normal.   Left Ear: Hearing normal.   Nose: Nose normal. No rhinorrhea.   Mouth/Throat: Mucous membranes are normal.   Eyes: Pupils are equal, round, and reactive to light. Conjunctivae and lids are normal. Right eye exhibits no discharge. Left eye exhibits no discharge. Right conjunctiva is not injected. Left conjunctiva is not injected. Right pupil is round and reactive. Left pupil is round and reactive. " Pupils are equal.   Neck: Trachea normal and normal range of motion. Neck supple. No JVD present. No tracheal deviation present. No thyromegaly present.   Cardiovascular: Normal rate, regular rhythm, normal heart sounds and intact distal pulses. Exam reveals no gallop and no friction rub.   No murmur heard.  Pulses:       Radial pulses are 2+ on the right side, and 2+ on the left side.   Pulmonary/Chest: Effort normal and breath sounds normal. No stridor. No respiratory distress. She has no decreased breath sounds. She has no wheezes. She has no rhonchi. She has no rales.   Abdominal: Soft. Bowel sounds are normal. She exhibits no distension. There is no tenderness. There is no rigidity and no guarding.   Musculoskeletal: Normal range of motion. She exhibits no edema.   Lymphadenopathy:     She has no cervical adenopathy.   Neurological: She is alert and oriented to person, place, and time. She has normal strength. She displays no atrophy. She displays a negative Romberg sign. Coordination and gait normal.   Skin: Skin is warm and dry. Capillary refill takes less than 2 seconds. No lesion and no rash noted. No cyanosis. No pallor.   Psychiatric: Her speech is normal and behavior is normal. Judgment and thought content normal. Her mood appears anxious. Cognition and memory are normal. She is attentive.   Nursing note and vitals reviewed.     Assessment:       1. Anxiety and depression        Plan:       Anxiety and depression  -     mirtazapine (REMERON) 15 MG tablet; Take 1 tablet (15 mg total) by mouth every evening.  Dispense: 30 tablet; Refill: 11        Follow up in about 3 months (around 6/2/2020) for refills.      3/3/2020 VIANNEY Kim, FNP-C

## 2020-03-05 ENCOUNTER — PATIENT MESSAGE (OUTPATIENT)
Dept: FAMILY MEDICINE | Facility: CLINIC | Age: 35
End: 2020-03-05

## 2020-03-05 DIAGNOSIS — M79.7 FIBROMYALGIA: ICD-10-CM

## 2020-03-05 DIAGNOSIS — M79.7 FIBROMYALGIA: Primary | ICD-10-CM

## 2020-03-05 RX ORDER — PREGABALIN 150 MG/1
150 CAPSULE ORAL DAILY
Qty: 90 CAPSULE | Refills: 0 | Status: SHIPPED | OUTPATIENT
Start: 2020-03-05 | End: 2020-03-05

## 2020-03-05 NOTE — TELEPHONE ENCOUNTER
Please see pt portal msg. Celine sent Lyrica 90 day supply to [facility] Pt states insurance won't cover 90 days and and will not cover med unless sent to [facility]. Pt requesting it be sent as a 30 day supply to [facility]. Can you rewrite since Celine is out sick. Pt requesting. thx    30 day is pended below. May need to open encounter to see the order.

## 2020-03-07 RX ORDER — PREGABALIN 150 MG/1
150 CAPSULE ORAL DAILY
Qty: 30 CAPSULE | Refills: 2 | Status: SHIPPED | OUTPATIENT
Start: 2020-03-07 | End: 2020-06-02 | Stop reason: SDUPTHER

## 2020-03-13 ENCOUNTER — PATIENT MESSAGE (OUTPATIENT)
Dept: FAMILY MEDICINE | Facility: CLINIC | Age: 35
End: 2020-03-13

## 2020-06-02 ENCOUNTER — OFFICE VISIT (OUTPATIENT)
Dept: FAMILY MEDICINE | Facility: CLINIC | Age: 35
End: 2020-06-02
Payer: COMMERCIAL

## 2020-06-02 ENCOUNTER — TELEPHONE (OUTPATIENT)
Dept: FAMILY MEDICINE | Facility: CLINIC | Age: 35
End: 2020-06-02

## 2020-06-02 DIAGNOSIS — M79.7 FIBROMYALGIA: ICD-10-CM

## 2020-06-02 LAB
ALBUMIN SERPL-MCNC: 4.1 G/DL (ref 3.6–5.1)
ALBUMIN/GLOB SERPL: 1.8 (CALC) (ref 1–2.5)
ALP SERPL-CCNC: 63 U/L (ref 31–125)
ALT SERPL-CCNC: 25 U/L (ref 6–29)
AST SERPL-CCNC: 17 U/L (ref 10–30)
BASOPHILS # BLD AUTO: 29 CELLS/UL (ref 0–200)
BASOPHILS NFR BLD AUTO: 0.5 %
BILIRUB SERPL-MCNC: 0.4 MG/DL (ref 0.2–1.2)
BUN SERPL-MCNC: 13 MG/DL (ref 7–25)
BUN/CREAT SERPL: ABNORMAL (CALC) (ref 6–22)
CALCIUM SERPL-MCNC: 9.1 MG/DL (ref 8.6–10.2)
CHLORIDE SERPL-SCNC: 106 MMOL/L (ref 98–110)
CHOLEST SERPL-MCNC: 179 MG/DL
CHOLEST/HDLC SERPL: 5.1 (CALC)
CO2 SERPL-SCNC: 26 MMOL/L (ref 20–32)
CREAT SERPL-MCNC: 0.78 MG/DL (ref 0.5–1.1)
EOSINOPHIL # BLD AUTO: 68 CELLS/UL (ref 15–500)
EOSINOPHIL NFR BLD AUTO: 1.2 %
ERYTHROCYTE [DISTWIDTH] IN BLOOD BY AUTOMATED COUNT: 13 % (ref 11–15)
GFRSERPLBLD MDRD-ARVRAT: 98 ML/MIN/1.73M2
GLOBULIN SER CALC-MCNC: 2.3 G/DL (CALC) (ref 1.9–3.7)
GLUCOSE SERPL-MCNC: 118 MG/DL (ref 65–99)
HCT VFR BLD AUTO: 41.4 % (ref 35–45)
HDLC SERPL-MCNC: 35 MG/DL
HGB BLD-MCNC: 13.8 G/DL (ref 11.7–15.5)
LDLC SERPL CALC-MCNC: 112 MG/DL (CALC)
LYMPHOCYTES # BLD AUTO: 2462 CELLS/UL (ref 850–3900)
LYMPHOCYTES NFR BLD AUTO: 43.2 %
MCH RBC QN AUTO: 31.7 PG (ref 27–33)
MCHC RBC AUTO-ENTMCNC: 33.3 G/DL (ref 32–36)
MCV RBC AUTO: 95.2 FL (ref 80–100)
MONOCYTES # BLD AUTO: 251 CELLS/UL (ref 200–950)
MONOCYTES NFR BLD AUTO: 4.4 %
NEUTROPHILS # BLD AUTO: 2890 CELLS/UL (ref 1500–7800)
NEUTROPHILS NFR BLD AUTO: 50.7 %
NONHDLC SERPL-MCNC: 144 MG/DL (CALC)
PLATELET # BLD AUTO: 229 THOUSAND/UL (ref 140–400)
PMV BLD REES-ECKER: 11.2 FL (ref 7.5–12.5)
POTASSIUM SERPL-SCNC: 4.3 MMOL/L (ref 3.5–5.3)
PROT SERPL-MCNC: 6.4 G/DL (ref 6.1–8.1)
RBC # BLD AUTO: 4.35 MILLION/UL (ref 3.8–5.1)
SODIUM SERPL-SCNC: 140 MMOL/L (ref 135–146)
TRIGL SERPL-MCNC: 201 MG/DL
WBC # BLD AUTO: 5.7 THOUSAND/UL (ref 3.8–10.8)

## 2020-06-02 PROCEDURE — 99213 OFFICE O/P EST LOW 20 MIN: CPT | Mod: 95,,, | Performed by: NURSE PRACTITIONER

## 2020-06-02 PROCEDURE — 99213 PR OFFICE/OUTPT VISIT, EST, LEVL III, 20-29 MIN: ICD-10-PCS | Mod: 95,,, | Performed by: NURSE PRACTITIONER

## 2020-06-02 RX ORDER — CYCLOBENZAPRINE HCL 10 MG
10 TABLET ORAL
Qty: 30 TABLET | Refills: 2 | Status: SHIPPED | OUTPATIENT
Start: 2020-06-02 | End: 2020-09-28

## 2020-06-02 NOTE — PROGRESS NOTES
Subjective:        The chief complaint leading to consultation is: med refills  The patient location is:  Home  Visit type: Virtual visit with synchronous audio/video or audio only  This was a video visit in lieu of in-person visit due to the coronavirus emergency. Patient acknowledged and consented to the video visit encounter.     Patient presents via telemedicine visit for fibromyalgia refills      Past Surgical History:   Procedure Laterality Date    APPENDECTOMY       SECTION      preclampsia      x 2     Past Medical History:   Diagnosis Date    Anxiety and depression 2019    Fibromyalgia 2012    Lupus     states per [place]    Severe preeclampsia 2015     Family History   Problem Relation Age of Onset    Hypertension Mother     Rheum arthritis Mother     Thyroid disease Mother     Fibromyalgia Mother     Alcohol abuse Father     Cancer Father     Diabetes Mellitus Father     Hypertension Father     Cancer Maternal Grandmother         breast    Breast cancer Maternal Grandmother     Cancer Maternal Grandfather     Cancer Maternal Aunt         breast    Breast cancer Maternal Aunt     Breast cancer Paternal Grandmother         Social History:   Marital Status:   Alcohol History:  reports that she does not drink alcohol.  Tobacco History:  reports that she has never smoked. She has never used smokeless tobacco.  Drug History:  reports that she does not use drugs.    Review of patient's allergies indicates:  No Known Allergies    Current Outpatient Medications   Medication Sig Dispense Refill    cyclobenzaprine (FLEXERIL) 10 MG tablet Take 1 tablet (10 mg total) by mouth as needed for Muscle spasms. 30 tablet 2    pregabalin (LYRICA) 150 MG capsule Take 1 capsule (150 mg total) by mouth once daily. 30 capsule 2     No current facility-administered medications for this visit.        Review of Systems   Constitutional: Negative for activity change, appetite  change, fatigue and unexpected weight change.   HENT: Negative for congestion, ear pain, hearing loss, postnasal drip, rhinorrhea, sinus pressure, sinus pain, sneezing, sore throat and trouble swallowing.    Eyes: Negative for photophobia, pain, discharge and visual disturbance.   Respiratory: Negative for cough, chest tightness, shortness of breath and wheezing.    Cardiovascular: Negative for chest pain, palpitations and leg swelling.   Gastrointestinal: Negative for abdominal distention, abdominal pain, blood in stool, constipation, diarrhea, nausea and vomiting.   Endocrine: Negative for cold intolerance, heat intolerance, polydipsia and polyuria.   Genitourinary: Negative for difficulty urinating, dysuria, flank pain, frequency, hematuria, menstrual problem, pelvic pain and urgency.   Musculoskeletal: Positive for arthralgias and myalgias. Negative for back pain, joint swelling and neck pain.   Skin: Negative for pallor.   Allergic/Immunologic: Negative for environmental allergies and food allergies.   Neurological: Negative for dizziness, weakness, light-headedness, numbness and headaches.   Hematological: Does not bruise/bleed easily.   Psychiatric/Behavioral: Negative for agitation, confusion, decreased concentration, dysphoric mood and sleep disturbance. The patient is not nervous/anxious.          Objective:        Physical Exam:   Physical Exam   Constitutional: She is oriented to person, place, and time. She appears well-developed and well-nourished. No distress.   Obese   HENT:   Head: Normocephalic and atraumatic.   Nose: Nose normal.   Eyes: Lids are normal.   Neck: Normal range of motion. No tracheal deviation present.   Pulmonary/Chest: Effort normal. No respiratory distress.   Neurological: She is alert and oriented to person, place, and time. GCS eye subscore is 4. GCS verbal subscore is 5. GCS motor subscore is 6.   Skin: No pallor.   Psychiatric: She has a normal mood and affect. Her speech is  normal and behavior is normal. Judgment and thought content normal. Cognition and memory are normal.            Assessment:       1. Fibromyalgia      Plan:   Fibromyalgia  -     cyclobenzaprine (FLEXERIL) 10 MG tablet; Take 1 tablet (10 mg total) by mouth as needed for Muscle spasms.  Dispense: 30 tablet; Refill: 2  - lyrica refill requested through Dr. Story d/t scope of practice limitations      Follow up in about 1 week (around 6/9/2020) for pre-op clearance (gastric sleeve).    Total time spent with patient: 29 mins    Each patient to whom he or she provides medical services by telemedicine is:  (1) informed of the relationship between the physician and patient and the respective role of any other health care provider with respect to management of the patient; and (2) notified that he or she may decline to receive medical services by telemedicine and may withdraw from such care at any time.    This note was created using [employer] voice recognition software that occasionally misinterprets phrases or words.

## 2020-06-03 RX ORDER — PREGABALIN 150 MG/1
150 CAPSULE ORAL DAILY
Qty: 30 CAPSULE | Refills: 5 | Status: SHIPPED | OUTPATIENT
Start: 2020-06-03 | End: 2020-06-09 | Stop reason: SDUPTHER

## 2020-06-09 ENCOUNTER — OFFICE VISIT (OUTPATIENT)
Dept: FAMILY MEDICINE | Facility: CLINIC | Age: 35
End: 2020-06-09
Payer: COMMERCIAL

## 2020-06-09 VITALS
BODY MASS INDEX: 48.04 KG/M2 | HEIGHT: 64 IN | OXYGEN SATURATION: 98 % | SYSTOLIC BLOOD PRESSURE: 128 MMHG | DIASTOLIC BLOOD PRESSURE: 72 MMHG | WEIGHT: 281.38 LBS | TEMPERATURE: 98 F | HEART RATE: 64 BPM

## 2020-06-09 DIAGNOSIS — Z01.818 PRE-OPERATIVE CLEARANCE: Primary | ICD-10-CM

## 2020-06-09 DIAGNOSIS — M79.7 FIBROMYALGIA: ICD-10-CM

## 2020-06-09 LAB — EKG 12-LEAD: NORMAL

## 2020-06-09 PROCEDURE — 93000 ELECTROCARDIOGRAM COMPLETE: CPT | Mod: S$GLB,,, | Performed by: NURSE PRACTITIONER

## 2020-06-09 PROCEDURE — 93000 POCT EKG 12-LEAD: ICD-10-PCS | Mod: S$GLB,,, | Performed by: NURSE PRACTITIONER

## 2020-06-09 PROCEDURE — 99213 PR OFFICE/OUTPT VISIT, EST, LEVL III, 20-29 MIN: ICD-10-PCS | Mod: 25,S$GLB,, | Performed by: NURSE PRACTITIONER

## 2020-06-09 PROCEDURE — 3008F PR BODY MASS INDEX (BMI) DOCUMENTED: ICD-10-PCS | Mod: S$GLB,,, | Performed by: NURSE PRACTITIONER

## 2020-06-09 PROCEDURE — 3008F BODY MASS INDEX DOCD: CPT | Mod: S$GLB,,, | Performed by: NURSE PRACTITIONER

## 2020-06-09 PROCEDURE — 99213 OFFICE O/P EST LOW 20 MIN: CPT | Mod: 25,S$GLB,, | Performed by: NURSE PRACTITIONER

## 2020-06-09 RX ORDER — PREGABALIN 150 MG/1
150 CAPSULE ORAL DAILY
Qty: 90 CAPSULE | Refills: 0 | Status: SHIPPED | OUTPATIENT
Start: 2020-06-09 | End: 2020-09-03 | Stop reason: SDUPTHER

## 2020-06-09 NOTE — LETTER
[facility]      VIANNEY Kim,HOWARD              [facility]  [address]  [place] [address]  Dept: [phone]  Dept Fax: [phone]                                                                                                     Re: Name: Janice Hartmann          : 1985        Janice Hartmann has been examined by me on 2020, and appears to be physically well for       _X__ General Anesthesia       ___ Sports Participation     ___ School /      ___ Camp    Labs & EKG have been reviewed. Awaiting CXR results. Appears to be moderate risk for complications r/t BMI & SONIA.      Sincerely,      VIANNEY Francisco,HOWARD

## 2020-06-09 NOTE — PROGRESS NOTES
SUBJECTIVE:      Patient ID: Janice Hartmann is a 35 y.o. female.    Chief Complaint: Pre-op Exam (needs refills)    Patient requesting physical and medical clearance for gastric sleeve, she states she does not have a surgery date as of yet - needed to be cleared by her PCP before date could be scheduled      Past Surgical History:   Procedure Laterality Date    APPENDECTOMY       SECTION      preclampsia      x 2     Family History   Problem Relation Age of Onset    Hypertension Mother     Rheum arthritis Mother     Thyroid disease Mother     Fibromyalgia Mother     Alcohol abuse Father     Cancer Father     Diabetes Mellitus Father     Hypertension Father     Cancer Maternal Grandmother         breast    Breast cancer Maternal Grandmother     Cancer Maternal Grandfather     Cancer Maternal Aunt         breast    Breast cancer Maternal Aunt     Breast cancer Paternal Grandmother       Social History     Socioeconomic History    Marital status:      Spouse name: Not on file    Number of children: 2    Years of education: Not on file    Highest education level: Not on file   Occupational History    Occupation: /mgr   Social Needs    Financial resource strain: Not very hard    Food insecurity:     Worry: Never true     Inability: Never true    Transportation needs:     Medical: No     Non-medical: No   Tobacco Use    Smoking status: Never Smoker    Smokeless tobacco: Never Used   Substance and Sexual Activity    Alcohol use: No     Frequency: Never     Drinks per session: 1 or 2     Binge frequency: Never    Drug use: No    Sexual activity: Yes     Partners: Male   Lifestyle    Physical activity:     Days per week: 2 days     Minutes per session: 30 min    Stress: Only a little   Relationships    Social connections:     Talks on phone: More than three times a week     Gets together: More than three times a week     Attends Sabianism service: Not on  file     Active member of club or organization: Yes     Attends meetings of clubs or organizations: 1 to 4 times per year     Relationship status:    Other Topics Concern    Not on file   Social History Narrative    Not on file     Current Outpatient Medications   Medication Sig Dispense Refill    cyclobenzaprine (FLEXERIL) 10 MG tablet Take 1 tablet (10 mg total) by mouth as needed for Muscle spasms. 30 tablet 2    pregabalin (LYRICA) 150 MG capsule Take 1 capsule (150 mg total) by mouth once daily. 90 capsule 0     No current facility-administered medications for this visit.      Review of patient's allergies indicates:  No Known Allergies   Past Medical History:   Diagnosis Date    Anxiety and depression 2019    Fibromyalgia 2012    Lupus     states per [place]    Severe preeclampsia 2015     Past Surgical History:   Procedure Laterality Date    APPENDECTOMY       SECTION      preclampsia      x 2       Review of Systems   Constitutional: Negative for activity change, appetite change, fatigue and unexpected weight change.   HENT: Negative for congestion, ear pain, hearing loss, postnasal drip, sinus pressure, sinus pain, sneezing and sore throat.    Eyes: Negative for photophobia and pain.   Respiratory: Negative for cough, chest tightness, shortness of breath and wheezing.         SONIA on CPAP   Cardiovascular: Negative for chest pain, palpitations and leg swelling.   Gastrointestinal: Negative for abdominal distention, abdominal pain, blood in stool, constipation, diarrhea, nausea and vomiting.   Endocrine: Negative for cold intolerance, heat intolerance, polydipsia and polyuria.   Genitourinary: Negative for difficulty urinating, dysuria, flank pain, frequency, hematuria, pelvic pain and urgency.   Musculoskeletal: Positive for arthralgias and myalgias. Negative for back pain, joint swelling and neck pain.   Skin: Negative for pallor.   Allergic/Immunologic: Negative  "for environmental allergies and food allergies.   Neurological: Negative for dizziness, weakness, light-headedness and numbness.   Hematological: Does not bruise/bleed easily.   Psychiatric/Behavioral: Positive for sleep disturbance. Negative for agitation, confusion and decreased concentration. The patient is nervous/anxious.       OBJECTIVE:      Vitals:    06/09/20 0927   BP: 128/72   BP Location: Left arm   Patient Position: Sitting   BP Method: Large (Manual)   Pulse: 64   Temp: 97.6 °F (36.4 °C)   TempSrc: Temporal   SpO2: 98%   Weight: 127.6 kg (281 lb 6.4 oz)   Height: 5' 4" (1.626 m)     Physical Exam   Constitutional: She is oriented to person, place, and time. Vital signs are normal. She appears well-developed and well-nourished. No distress.   Morbidly obese - 6 lb gain since March visit   HENT:   Head: Normocephalic and atraumatic.   Right Ear: Hearing normal.   Left Ear: Hearing normal.   Nose: Nose normal. No rhinorrhea.   Mouth/Throat: Mucous membranes are normal.   Eyes: Pupils are equal, round, and reactive to light. Conjunctivae and lids are normal. Right eye exhibits no discharge. Left eye exhibits no discharge. Right conjunctiva is not injected. Left conjunctiva is not injected. Right pupil is round and reactive. Left pupil is round and reactive. Pupils are equal.   Neck: Trachea normal and normal range of motion. Neck supple. No JVD present. No tracheal deviation present. No thyromegaly present.   Cardiovascular: Normal rate, regular rhythm, normal heart sounds and intact distal pulses. Exam reveals no gallop and no friction rub.   No murmur heard.  Pulses:       Radial pulses are 2+ on the right side, and 2+ on the left side.   Pulmonary/Chest: Effort normal and breath sounds normal. No stridor. No respiratory distress. She has no decreased breath sounds. She has no wheezes. She has no rhonchi. She has no rales.   Abdominal: Soft. Bowel sounds are normal. She exhibits no distension. There is " no tenderness. There is no rigidity and no guarding.   Musculoskeletal: Normal range of motion. She exhibits no edema.   Lymphadenopathy:     She has no cervical adenopathy.   Neurological: She is alert and oriented to person, place, and time. She has normal strength. She displays no atrophy. She displays a negative Romberg sign. Coordination and gait normal.   Skin: Skin is warm and dry. Capillary refill takes less than 2 seconds. No lesion and no rash noted. No cyanosis. No pallor.   Psychiatric: She has a normal mood and affect. Her speech is normal and behavior is normal. Judgment and thought content normal. Cognition and memory are normal. She is attentive.   Nursing note and vitals reviewed.     Assessment:       1. Pre-operative clearance        Plan:       Pre-operative clearance  -     POCT EKG 12-LEAD (NOT FOR [facility] USE)  -     Cancel: X-Ray Chest PA And Lateral; Future; Expected date: 06/09/2020  -     Hemoglobin A1C, POCT  -     X-Ray Chest PA And Lateral; Future; Expected date: 06/09/2020      At this point patient has been physically cleared for surgery pending results of chest x-ray. Letter placed in chart to be faxed to bariatric surgery.      Follow up in about 6 months (around 12/9/2020) for lyrica refills.      6/9/2020 VIANNEY Kim, FNP-C

## 2020-06-10 ENCOUNTER — TELEPHONE (OUTPATIENT)
Dept: FAMILY MEDICINE | Facility: CLINIC | Age: 35
End: 2020-06-10

## 2020-06-10 NOTE — TELEPHONE ENCOUNTER
----- Message from VIANNEY Francisco,ERNIE-C sent at 6/9/2020  6:35 PM CDT -----  Please fax 6/9 chart note, EKG, and clearance letter to bariatric surgeon.  You may need to call patient and find out his contact information.

## 2020-09-02 ENCOUNTER — PATIENT MESSAGE (OUTPATIENT)
Dept: FAMILY MEDICINE | Facility: CLINIC | Age: 35
End: 2020-09-02

## 2020-09-02 DIAGNOSIS — M79.7 FIBROMYALGIA: ICD-10-CM

## 2020-09-03 RX ORDER — PREGABALIN 150 MG/1
150 CAPSULE ORAL DAILY
Qty: 90 CAPSULE | Refills: 0 | Status: SHIPPED | OUTPATIENT
Start: 2020-09-03 | End: 2020-12-09 | Stop reason: SDUPTHER

## 2020-12-02 ENCOUNTER — PATIENT MESSAGE (OUTPATIENT)
Dept: FAMILY MEDICINE | Facility: CLINIC | Age: 35
End: 2020-12-02

## 2020-12-09 ENCOUNTER — OFFICE VISIT (OUTPATIENT)
Dept: FAMILY MEDICINE | Facility: CLINIC | Age: 35
End: 2020-12-09
Payer: COMMERCIAL

## 2020-12-09 VITALS
BODY MASS INDEX: 35.59 KG/M2 | RESPIRATION RATE: 16 BRPM | HEIGHT: 64 IN | WEIGHT: 208.5 LBS | OXYGEN SATURATION: 98 % | HEART RATE: 65 BPM | TEMPERATURE: 98 F

## 2020-12-09 DIAGNOSIS — M79.7 FIBROMYALGIA: Primary | ICD-10-CM

## 2020-12-09 DIAGNOSIS — Z11.59 ENCOUNTER FOR HEPATITIS C SCREENING TEST FOR LOW RISK PATIENT: ICD-10-CM

## 2020-12-09 DIAGNOSIS — E78.00 ELEVATED LDL CHOLESTEROL LEVEL: ICD-10-CM

## 2020-12-09 DIAGNOSIS — E53.8 LOW FOLATE: ICD-10-CM

## 2020-12-09 DIAGNOSIS — Z13.1 SCREENING FOR DIABETES MELLITUS: ICD-10-CM

## 2020-12-09 DIAGNOSIS — Z11.4 SCREENING FOR HIV WITHOUT PRESENCE OF RISK FACTORS: ICD-10-CM

## 2020-12-09 DIAGNOSIS — M79.7 FIBROMYALGIA: ICD-10-CM

## 2020-12-09 DIAGNOSIS — E55.9 VITAMIN D DEFICIENCY: ICD-10-CM

## 2020-12-09 DIAGNOSIS — Z13.0 SCREENING FOR DEFICIENCY ANEMIA: ICD-10-CM

## 2020-12-09 PROCEDURE — 3008F BODY MASS INDEX DOCD: CPT | Mod: S$GLB,,, | Performed by: NURSE PRACTITIONER

## 2020-12-09 PROCEDURE — 99214 OFFICE O/P EST MOD 30 MIN: CPT | Mod: S$GLB,,, | Performed by: NURSE PRACTITIONER

## 2020-12-09 PROCEDURE — 1126F PR PAIN SEVERITY QUANTIFIED, NO PAIN PRESENT: ICD-10-PCS | Mod: S$GLB,,, | Performed by: NURSE PRACTITIONER

## 2020-12-09 PROCEDURE — 3008F PR BODY MASS INDEX (BMI) DOCUMENTED: ICD-10-PCS | Mod: S$GLB,,, | Performed by: NURSE PRACTITIONER

## 2020-12-09 PROCEDURE — 99214 PR OFFICE/OUTPT VISIT, EST, LEVL IV, 30-39 MIN: ICD-10-PCS | Mod: S$GLB,,, | Performed by: NURSE PRACTITIONER

## 2020-12-09 PROCEDURE — 1126F AMNT PAIN NOTED NONE PRSNT: CPT | Mod: S$GLB,,, | Performed by: NURSE PRACTITIONER

## 2020-12-09 RX ORDER — PREGABALIN 150 MG/1
150 CAPSULE ORAL DAILY
Qty: 90 CAPSULE | Refills: 1 | Status: SHIPPED | OUTPATIENT
Start: 2020-12-09 | End: 2021-06-02 | Stop reason: SDUPTHER

## 2020-12-09 RX ORDER — ERGOCALCIFEROL 1.25 MG/1
50000 CAPSULE ORAL
Qty: 12 CAPSULE | Refills: 1 | Status: SHIPPED | OUTPATIENT
Start: 2020-12-09 | End: 2021-06-02 | Stop reason: SDUPTHER

## 2020-12-09 RX ORDER — FOLIC ACID 1 MG/1
1 TABLET ORAL DAILY
Qty: 90 TABLET | Refills: 1 | Status: SHIPPED | OUTPATIENT
Start: 2020-12-09 | End: 2021-06-02 | Stop reason: SDUPTHER

## 2020-12-09 RX ORDER — PREGABALIN 150 MG/1
150 CAPSULE ORAL DAILY
Qty: 90 CAPSULE | Refills: 0 | Status: CANCELLED | OUTPATIENT
Start: 2020-12-09

## 2020-12-09 RX ORDER — AMPICILLIN TRIHYDRATE 250 MG
2 CAPSULE ORAL DAILY
Qty: 180 EACH | Refills: 1 | Status: SHIPPED | OUTPATIENT
Start: 2020-12-09 | End: 2021-06-02 | Stop reason: SDUPTHER

## 2020-12-09 RX ORDER — CYCLOBENZAPRINE HCL 10 MG
10 TABLET ORAL
Qty: 90 TABLET | Refills: 1 | Status: SHIPPED | OUTPATIENT
Start: 2020-12-09 | End: 2023-05-09

## 2020-12-09 RX ORDER — ERGOCALCIFEROL 1.25 MG/1
CAPSULE ORAL
COMMUNITY
Start: 2020-11-20 | End: 2020-12-09 | Stop reason: SDUPTHER

## 2020-12-10 NOTE — PROGRESS NOTES
SUBJECTIVE:      Patient ID: Janice Hartmann is a 35 y.o. female.    Chief Complaint: Follow-up    Presents for med refills & lab review - underwent gastric sleeve several months ago & doing well with weight loss, states she still has more to lose but is happy with her progress so far     labs reviewed from her phone at today's visit: total chol 183, HDL 26, trigs 148, , fasting gluc 78, vit D 19, folate 1.7, B12 423, B1 64; states bariatric surgeon hasn't addressed abnormalities as of yet    Discussed outstanding health maintenance - refuses immunizations at this time    Muscle Pain  This is a chronic problem. The current episode started more than 1 year ago. The problem occurs intermittently. The problem has been gradually improving. Associated symptoms include arthralgias and myalgias. Pertinent negatives include no abdominal pain, chest pain, congestion, coughing, fatigue, headaches, joint swelling, nausea, neck pain, numbness, sore throat, vomiting or weakness. The symptoms are aggravated by exertion. She has tried NSAIDs, rest, sleep, position changes, relaxation, lying down and heat (Lyrica) for the symptoms. The treatment provided significant relief.   Fatigue  This is a new problem. The current episode started more than 1 month ago. The problem occurs daily. The problem has been waxing and waning. Associated symptoms include arthralgias and myalgias. Pertinent negatives include no abdominal pain, chest pain, congestion, coughing, fatigue, headaches, joint swelling, nausea, neck pain, numbness, sore throat, vomiting or weakness. She has tried rest, sleep, relaxation, position changes and lying down for the symptoms. The treatment provided mild relief.       Past Surgical History:   Procedure Laterality Date    APPENDECTOMY       SECTION      preclampsia      x 2     Family History   Problem Relation Age of Onset    Hypertension Mother     Rheum arthritis Mother     Thyroid  disease Mother     Fibromyalgia Mother     Alcohol abuse Father     Cancer Father     Diabetes Mellitus Father     Hypertension Father     Cancer Maternal Grandmother         breast    Breast cancer Maternal Grandmother     Cancer Maternal Grandfather     Cancer Maternal Aunt         breast    Breast cancer Maternal Aunt     Breast cancer Paternal Grandmother       Social History     Socioeconomic History    Marital status:      Spouse name: Not on file    Number of children: 2    Years of education: Not on file    Highest education level: Not on file   Occupational History    Occupation: /mgr   Social Needs    Financial resource strain: Not very hard    Food insecurity     Worry: Never true     Inability: Never true    Transportation needs     Medical: No     Non-medical: No   Tobacco Use    Smoking status: Never Smoker    Smokeless tobacco: Never Used   Substance and Sexual Activity    Alcohol use: No     Frequency: Never     Drinks per session: 1 or 2     Binge frequency: Never    Drug use: No    Sexual activity: Yes     Partners: Male   Lifestyle    Physical activity     Days per week: 2 days     Minutes per session: 30 min    Stress: Only a little   Relationships    Social connections     Talks on phone: More than three times a week     Gets together: More than three times a week     Attends Gnosticism service: Not on file     Active member of club or organization: Yes     Attends meetings of clubs or organizations: 1 to 4 times per year     Relationship status:    Other Topics Concern    Not on file   Social History Narrative    Not on file     Current Outpatient Medications   Medication Sig Dispense Refill    cyclobenzaprine (FLEXERIL) 10 MG tablet Take 1 tablet (10 mg total) by mouth as needed for Muscle spasms. 90 tablet 1    ergocalciferol (ERGOCALCIFEROL) 50,000 unit Cap Take 1 capsule (50,000 Units total) by mouth every 30 days. 12 capsule 1     folic acid (FOLVITE) 1 MG tablet Take 1 tablet (1 mg total) by mouth once daily. 90 tablet 1    pregabalin (LYRICA) 150 MG capsule Take 1 capsule (150 mg total) by mouth once daily. 90 capsule 1    red yeast rice 600 mg Cap Take 2 capsules by mouth once daily. 180 each 1     No current facility-administered medications for this visit.      Review of patient's allergies indicates:  No Known Allergies   Past Medical History:   Diagnosis Date    Anxiety and depression 2019    Fibromyalgia 2012    Lupus     states per [place]    Severe preeclampsia 2015     Past Surgical History:   Procedure Laterality Date    APPENDECTOMY       SECTION      preclampsia      x 2       Review of Systems   Constitutional: Negative for activity change, appetite change, fatigue, irritability and unexpected weight change.   HENT: Negative for congestion, ear pain, hearing loss, postnasal drip, rhinorrhea, sinus pressure, sinus pain, sneezing, sore throat and trouble swallowing.    Eyes: Negative for photophobia, pain, discharge and visual disturbance.   Respiratory: Negative for cough, chest tightness, shortness of breath and wheezing.         SONIA stable on CPAP   Cardiovascular: Negative for chest pain, palpitations and leg swelling.   Gastrointestinal: Negative for abdominal distention, abdominal pain, blood in stool, constipation, diarrhea, nausea and vomiting.   Endocrine: Negative for cold intolerance, heat intolerance, polydipsia and polyuria.   Genitourinary: Negative for difficulty urinating, dysuria, flank pain, frequency, hematuria, menstrual problem, pelvic pain and urgency.   Musculoskeletal: Positive for arthralgias and myalgias. Negative for back pain, joint swelling and neck pain.        States fibro mostly affects upper extremities   Skin: Negative for pallor.   Allergic/Immunologic: Negative for environmental allergies and food allergies.   Neurological: Negative for dizziness, weakness,  "light-headedness, numbness and headaches.   Hematological: Does not bruise/bleed easily.   Psychiatric/Behavioral: Negative for agitation, confusion, decreased concentration, dysphoric mood and sleep disturbance. The patient is not nervous/anxious and does not have insomnia.       OBJECTIVE:      Vitals:    12/09/20 1138   BP: (P) 114/76   BP Location: (P) Right arm   Patient Position: (P) Sitting   BP Method: (P) Large (Manual)   Pulse: 65   Resp: 16   Temp: 98.1 °F (36.7 °C)   TempSrc: Oral   SpO2: 98%   Weight: 94.6 kg (208 lb 8 oz)   Height: 5' 4" (1.626 m)     Physical Exam  Vitals signs and nursing note reviewed.   Constitutional:       General: She is not in acute distress.     Appearance: Normal appearance. She is well-developed. She is obese.      Comments: 73# loss since Filomena visit   HENT:      Head: Normocephalic and atraumatic.      Right Ear: Hearing normal.      Left Ear: Hearing normal.      Nose: Nose normal. No rhinorrhea.   Eyes:      General: Lids are normal.         Right eye: No discharge.         Left eye: No discharge.      Conjunctiva/sclera: Conjunctivae normal.      Right eye: Right conjunctiva is not injected.      Left eye: Left conjunctiva is not injected.      Pupils: Pupils are equal, round, and reactive to light. Pupils are equal.      Right eye: Pupil is round and reactive.      Left eye: Pupil is round and reactive.   Neck:      Musculoskeletal: Normal range of motion and neck supple.      Thyroid: No thyromegaly.      Vascular: No JVD.      Trachea: Trachea normal. No tracheal deviation.   Cardiovascular:      Rate and Rhythm: Normal rate and regular rhythm.      Pulses:           Radial pulses are 2+ on the right side and 2+ on the left side.      Heart sounds: Normal heart sounds. No murmur. No friction rub. No gallop.    Pulmonary:      Effort: Pulmonary effort is normal. No respiratory distress.      Breath sounds: Normal breath sounds. No stridor. No decreased breath sounds, " wheezing, rhonchi or rales.   Abdominal:      General: Bowel sounds are normal. There is no distension.      Palpations: Abdomen is soft. Abdomen is not rigid.      Tenderness: There is no abdominal tenderness. There is no guarding.   Musculoskeletal: Normal range of motion.   Lymphadenopathy:      Cervical: No cervical adenopathy.   Skin:     General: Skin is warm and dry.      Capillary Refill: Capillary refill takes less than 2 seconds.      Coloration: Skin is not pale.      Findings: No lesion or rash.   Neurological:      Mental Status: She is alert and oriented to person, place, and time.      Motor: No atrophy.      Coordination: Coordination normal.      Gait: Gait normal.   Psychiatric:         Attention and Perception: Attention normal. She is attentive.         Mood and Affect: Mood and affect normal.         Speech: Speech normal.         Behavior: Behavior normal.         Thought Content: Thought content normal.         Cognition and Memory: Cognition and memory normal.         Judgment: Judgment normal.        Assessment:       1. Fibromyalgia    2. Low folate    3. Vitamin D deficiency    4. Elevated LDL cholesterol level    5. Screening for HIV without presence of risk factors    6. Encounter for hepatitis C screening test for low risk patient    7. Screening for deficiency anemia    8. Screening for diabetes mellitus        Plan:       Fibromyalgia  -     cyclobenzaprine (FLEXERIL) 10 MG tablet; Take 1 tablet (10 mg total) by mouth as needed for Muscle spasms.  Dispense: 90 tablet; Refill: 1    Low folate  -     folic acid (FOLVITE) 1 MG tablet; Take 1 tablet (1 mg total) by mouth once daily.  Dispense: 90 tablet; Refill: 1    Vitamin D deficiency  -     ergocalciferol (ERGOCALCIFEROL) 50,000 unit Cap; Take 1 capsule (50,000 Units total) by mouth every 30 days.  Dispense: 12 capsule; Refill: 1    Elevated LDL cholesterol level  -     Lipid Panel; Future; Expected date: 12/09/2020  -     red yeast  rice 600 mg Cap; Take 2 capsules by mouth once daily.  Dispense: 180 each; Refill: 1    Screening for HIV without presence of risk factors  -     HIV 1/2 Ag/Ab (4th Gen); Future; Expected date: 12/09/2020    Encounter for hepatitis C screening test for low risk patient  -     Hepatitis C Antibody; Future; Expected date: 12/09/2020    Screening for deficiency anemia  -     CBC Auto Differential; Future; Expected date: 12/16/2020    Screening for diabetes mellitus  -     Comprehensive Metabolic Panel; Future; Expected date: 12/09/2020          Follow up in about 6 months (around 6/9/2021) for lab review & Lyrica refills.      12/10/2020 VIANNEY Kim, FNP-C

## 2021-03-04 ENCOUNTER — PATIENT MESSAGE (OUTPATIENT)
Dept: FAMILY MEDICINE | Facility: CLINIC | Age: 36
End: 2021-03-04

## 2021-03-04 DIAGNOSIS — Z98.84 S/P BARIATRIC SURGERY: Primary | ICD-10-CM

## 2021-03-09 PROBLEM — Z98.84 S/P BARIATRIC SURGERY: Status: ACTIVE | Noted: 2021-03-09

## 2021-06-02 ENCOUNTER — OFFICE VISIT (OUTPATIENT)
Dept: FAMILY MEDICINE | Facility: CLINIC | Age: 36
End: 2021-06-02
Payer: COMMERCIAL

## 2021-06-02 VITALS
OXYGEN SATURATION: 99 % | BODY MASS INDEX: 29.37 KG/M2 | SYSTOLIC BLOOD PRESSURE: 122 MMHG | DIASTOLIC BLOOD PRESSURE: 68 MMHG | HEIGHT: 64 IN | WEIGHT: 172 LBS | HEART RATE: 63 BPM

## 2021-06-02 DIAGNOSIS — M79.7 FIBROMYALGIA: Primary | ICD-10-CM

## 2021-06-02 DIAGNOSIS — M79.7 FIBROMYALGIA: ICD-10-CM

## 2021-06-02 DIAGNOSIS — E78.00 ELEVATED LDL CHOLESTEROL LEVEL: ICD-10-CM

## 2021-06-02 DIAGNOSIS — E55.9 VITAMIN D DEFICIENCY: ICD-10-CM

## 2021-06-02 DIAGNOSIS — E53.8 LOW FOLATE: ICD-10-CM

## 2021-06-02 PROCEDURE — 99214 OFFICE O/P EST MOD 30 MIN: CPT | Mod: S$GLB,,, | Performed by: NURSE PRACTITIONER

## 2021-06-02 PROCEDURE — 99214 PR OFFICE/OUTPT VISIT, EST, LEVL IV, 30-39 MIN: ICD-10-PCS | Mod: S$GLB,,, | Performed by: NURSE PRACTITIONER

## 2021-06-02 RX ORDER — PREGABALIN 150 MG/1
150 CAPSULE ORAL DAILY
Qty: 90 CAPSULE | Refills: 1 | Status: SHIPPED | OUTPATIENT
Start: 2021-06-02 | End: 2021-12-09 | Stop reason: SDUPTHER

## 2021-06-02 RX ORDER — FOLIC ACID 1 MG/1
1 TABLET ORAL DAILY
Qty: 90 TABLET | Refills: 1 | Status: SHIPPED | OUTPATIENT
Start: 2021-06-02 | End: 2021-12-06

## 2021-06-02 RX ORDER — AMPICILLIN TRIHYDRATE 250 MG
2 CAPSULE ORAL DAILY
Qty: 180 EACH | Refills: 1 | Status: SHIPPED | OUTPATIENT
Start: 2021-06-02 | End: 2021-12-09

## 2021-06-02 RX ORDER — ERGOCALCIFEROL 1.25 MG/1
50000 CAPSULE ORAL WEEKLY
Qty: 12 CAPSULE | Refills: 1 | Status: SHIPPED | OUTPATIENT
Start: 2021-06-02 | End: 2021-12-09 | Stop reason: SDUPTHER

## 2021-11-30 ENCOUNTER — PATIENT MESSAGE (OUTPATIENT)
Dept: FAMILY MEDICINE | Facility: CLINIC | Age: 36
End: 2021-11-30
Payer: COMMERCIAL

## 2021-12-05 LAB
25(OH)D3 SERPL-MCNC: 23 NG/ML (ref 30–100)
ALBUMIN SERPL-MCNC: 3.8 G/DL (ref 3.6–5.1)
ALBUMIN/GLOB SERPL: 1.7 (CALC) (ref 1–2.5)
ALP SERPL-CCNC: 49 U/L (ref 31–125)
ALT SERPL-CCNC: 10 U/L (ref 6–29)
AST SERPL-CCNC: 13 U/L (ref 10–30)
BILIRUB SERPL-MCNC: 0.5 MG/DL (ref 0.2–1.2)
BUN SERPL-MCNC: 9 MG/DL (ref 7–25)
BUN/CREAT SERPL: ABNORMAL (CALC) (ref 6–22)
CALCIUM SERPL-MCNC: 9.5 MG/DL (ref 8.6–10.2)
CHLORIDE SERPL-SCNC: 106 MMOL/L (ref 98–110)
CHOLEST SERPL-MCNC: 163 MG/DL
CHOLEST/HDLC SERPL: 3.8 (CALC)
CO2 SERPL-SCNC: 28 MMOL/L (ref 20–32)
CREAT SERPL-MCNC: 0.65 MG/DL (ref 0.5–1.1)
FOLATE SERPL-MCNC: 2.7 NG/ML
GLOBULIN SER CALC-MCNC: 2.2 G/DL (CALC) (ref 1.9–3.7)
GLUCOSE SERPL-MCNC: 80 MG/DL (ref 65–99)
HBA1C MFR BLD: 4.6 % OF TOTAL HGB
HDLC SERPL-MCNC: 43 MG/DL
LDLC SERPL CALC-MCNC: 100 MG/DL (CALC)
MAGNESIUM SERPL-MCNC: 2.3 MG/DL (ref 1.5–2.5)
NONHDLC SERPL-MCNC: 120 MG/DL (CALC)
POTASSIUM SERPL-SCNC: 4.7 MMOL/L (ref 3.5–5.3)
PROT SERPL-MCNC: 6 G/DL (ref 6.1–8.1)
SODIUM SERPL-SCNC: 142 MMOL/L (ref 135–146)
TRIGL SERPL-MCNC: 102 MG/DL
TSH SERPL-ACNC: 1.35 MIU/L
VIT B12 SERPL-MCNC: 363 PG/ML (ref 200–1100)
VIT B6 SERPL-MCNC: 13.9 NG/ML (ref 2.1–21.7)

## 2021-12-09 ENCOUNTER — OFFICE VISIT (OUTPATIENT)
Dept: FAMILY MEDICINE | Facility: CLINIC | Age: 36
End: 2021-12-09
Payer: COMMERCIAL

## 2021-12-09 VITALS
BODY MASS INDEX: 25.89 KG/M2 | HEART RATE: 62 BPM | DIASTOLIC BLOOD PRESSURE: 78 MMHG | HEIGHT: 64 IN | SYSTOLIC BLOOD PRESSURE: 116 MMHG | OXYGEN SATURATION: 99 % | WEIGHT: 151.63 LBS | TEMPERATURE: 98 F

## 2021-12-09 DIAGNOSIS — E55.9 VITAMIN D DEFICIENCY: ICD-10-CM

## 2021-12-09 DIAGNOSIS — R79.89 LOW VITAMIN B12 LEVEL: ICD-10-CM

## 2021-12-09 DIAGNOSIS — E53.8 FOLATE DEFICIENCY: ICD-10-CM

## 2021-12-09 DIAGNOSIS — M79.7 FIBROMYALGIA: ICD-10-CM

## 2021-12-09 DIAGNOSIS — Z98.84 S/P BARIATRIC SURGERY: Primary | ICD-10-CM

## 2021-12-09 DIAGNOSIS — Z00.00 ROUTINE HEALTH MAINTENANCE: Primary | ICD-10-CM

## 2021-12-09 PROCEDURE — 99395 PR PREVENTIVE VISIT,EST,18-39: ICD-10-PCS | Mod: S$GLB,,, | Performed by: NURSE PRACTITIONER

## 2021-12-09 PROCEDURE — 99395 PREV VISIT EST AGE 18-39: CPT | Mod: S$GLB,,, | Performed by: NURSE PRACTITIONER

## 2021-12-09 RX ORDER — ERGOCALCIFEROL 1.25 MG/1
50000 CAPSULE ORAL WEEKLY
Qty: 12 CAPSULE | Refills: 1 | Status: SHIPPED | OUTPATIENT
Start: 2021-12-09 | End: 2022-06-06

## 2021-12-09 RX ORDER — PREGABALIN 150 MG/1
150 CAPSULE ORAL DAILY
Qty: 90 CAPSULE | Refills: 1 | Status: SHIPPED | OUTPATIENT
Start: 2021-12-09 | End: 2022-06-08 | Stop reason: SDUPTHER

## 2022-01-04 ENCOUNTER — PATIENT MESSAGE (OUTPATIENT)
Dept: FAMILY MEDICINE | Facility: CLINIC | Age: 37
End: 2022-01-04
Payer: COMMERCIAL

## 2022-01-05 ENCOUNTER — OFFICE VISIT (OUTPATIENT)
Dept: URGENT CARE | Facility: CLINIC | Age: 37
End: 2022-01-05
Payer: COMMERCIAL

## 2022-01-05 VITALS
SYSTOLIC BLOOD PRESSURE: 138 MMHG | OXYGEN SATURATION: 97 % | DIASTOLIC BLOOD PRESSURE: 81 MMHG | HEART RATE: 74 BPM | TEMPERATURE: 98 F | RESPIRATION RATE: 17 BRPM

## 2022-01-05 DIAGNOSIS — J01.00 ACUTE NON-RECURRENT MAXILLARY SINUSITIS: ICD-10-CM

## 2022-01-05 DIAGNOSIS — R05.9 COUGH: Primary | ICD-10-CM

## 2022-01-05 PROCEDURE — 99203 PR OFFICE/OUTPT VISIT, NEW, LEVL III, 30-44 MIN: ICD-10-PCS | Mod: S$GLB,,, | Performed by: NURSE PRACTITIONER

## 2022-01-05 PROCEDURE — 99203 OFFICE O/P NEW LOW 30 MIN: CPT | Mod: S$GLB,,, | Performed by: NURSE PRACTITIONER

## 2022-01-05 RX ORDER — CODEINE PHOSPHATE AND GUAIFENESIN 10; 100 MG/5ML; MG/5ML
5 SOLUTION ORAL 3 TIMES DAILY PRN
Qty: 60 ML | Refills: 0 | Status: SHIPPED | OUTPATIENT
Start: 2022-01-05 | End: 2022-01-15

## 2022-01-05 RX ORDER — PREDNISONE 20 MG/1
20 TABLET ORAL DAILY
Qty: 5 TABLET | Refills: 0 | Status: SHIPPED | OUTPATIENT
Start: 2022-01-05 | End: 2022-01-10

## 2022-01-05 RX ORDER — AZITHROMYCIN 250 MG/1
TABLET, FILM COATED ORAL
Qty: 6 TABLET | Refills: 0 | Status: SHIPPED | OUTPATIENT
Start: 2022-01-05 | End: 2022-06-14

## 2022-01-05 NOTE — PROGRESS NOTES
Subjective:       Patient ID: Janice Hartmann is a 36 y.o. female.    Vitals:  oral temperature is 97.6 °F (36.4 °C). Her blood pressure is 138/81 and her pulse is 74. Her respiration is 17 and oxygen saturation is 97%.     Chief Complaint: Cough    Cough  This is a new problem. The current episode started 1 to 4 weeks ago. The cough is productive of sputum.       Respiratory: Positive for cough.        Objective:      Physical Exam      Assessment:       No diagnosis found.      Plan:         There are no diagnoses linked to this encounter.

## 2022-01-05 NOTE — PROGRESS NOTES
CHIEF COMPLAINT  Chief Complaint   Patient presents with    Cough       HPI  Janice Perez a 36 y.o. female who presents with c/o cough x 14 days. Pt reports she started 2 weeks ago with sinus congestion and post nasal drip that progressively moved into her chest. Pt reports she has been coughing with green sputum since. Pt reports she has been taking OTC cold meds with no relief. Pt reports fatigue because she can't sleep at night due to coughing. Pt denies fever, rash, chest pain, sob, abdominal pain, n/v/d       CURRENT MEDICATIONS  Current Outpatient Medications on File Prior to Visit   Medication Sig Dispense Refill    cyclobenzaprine (FLEXERIL) 10 MG tablet Take 1 tablet (10 mg total) by mouth as needed for Muscle spasms. 90 tablet 1    ergocalciferol (ERGOCALCIFEROL) 50,000 unit Cap Take 1 capsule (50,000 Units total) by mouth once a week. 12 capsule 1    folic acid (FOLVITE) 1 MG tablet TAKE 1 TABLET BY MOUTH EVERY DAY 90 tablet 1    pregabalin (LYRICA) 150 MG capsule Take 1 capsule (150 mg total) by mouth once daily. 90 capsule 1     No current facility-administered medications on file prior to visit.       ALLERGIES  Review of patient's allergies indicates:  No Known Allergies    Immunization History   Administered Date(s) Administered    COVID-19, MRNA, LN-S, PF (MODERNA FULL 0.5 ML DOSE) 2021, 2021       PAST MEDICAL HISTORY  Past Medical History:   Diagnosis Date    Anxiety and depression 2019    Fibromyalgia 2012    Lupus     states per [place]    Severe preeclampsia 2015       SURGICAL HISTORY  Past Surgical History:   Procedure Laterality Date    APPENDECTOMY       SECTION      CHOLECYSTECTOMY  2021    preclampsia      x 2       SOCIAL HISTORY  Social History     Socioeconomic History    Marital status:     Number of children: 2   Occupational History    Occupation: /mgr   Tobacco Use    Smoking status: Never Smoker     Smokeless tobacco: Never Used   Substance and Sexual Activity    Alcohol use: No    Drug use: No    Sexual activity: Yes     Partners: Male       FAMILY HISTORY  Family History   Problem Relation Age of Onset    Hypertension Mother     Rheum arthritis Mother     Thyroid disease Mother     Fibromyalgia Mother     Alcohol abuse Father     Cancer Father     Diabetes Mellitus Father     Hypertension Father     Cancer Maternal Grandmother         breast    Breast cancer Maternal Grandmother     Cancer Maternal Grandfather     Cancer Maternal Aunt         breast    Breast cancer Maternal Aunt     Breast cancer Paternal Grandmother        REVIEW OF SYSTEMS  Constitutional: No fever, chills, or weakness.  Eyes: No redness, pain, or discharge  HENT: No ear pain, no headache, no rhinorrhea, no throat pain + sinus congestion + post nasal drip  Respiratory: productive cough,no wheezing or shortness of breath  Cardiovascular: No chest pain, palpitations or edema  GI: No abdominal pain, nausea, vomiting or diarrhea  Gu: No dysuria, no hematuria, or discharge  Musculoskeletal: No pain, full range of motion. Good sensation  Skin: No rash or abrasion  Neurologic: No focal weakness or sensory changes.  All systems otherwise negative except as noted in the Review of Systems and History of Present Illness      PHYSICAL EXAM  Reviewed Triage Note  VITAL SIGNS: 138/81  Constitutional: Well developed, well nourished, Alert and oriented x3, No acute distress, non-toxic appearance.  HENT: Normocephalic, Atraumatic, Bilateral external ears normal, external nose negative, oropharynx moist, No oral exudates.+ maxillary sinus tenderness  Eyes: PERRL, EOMI, Conjunctiva normal, No discharge.  Neck: Normal range of motion, no tenderness, supple, no carotid bruits  Respiratory: Normal breath sounds, no respiratory distress, no wheezing, no rhonchi, no rales  Cardiovascular: HR 74, normal rhythm, no murmurs, no rubs, no  gallops.  Gi: Bowel sounds normal, soft, no tenderness, non-distended, no masses, no pulsatile masses.  Musculoskeletal: No edema, no tenderness, no cyanosis, no clubbing. Good range of motion in all major joints. No tenderness to palpation or major deformities noted.   Integument: Warm, Dry, No erythema, no rash  Neurologic: Normal motor function, normal sensory function. No focal deficits noted. Intact distal pulses  Psychiatric: Affect normal, judgment normal, mood normal      LABS  Pertinent labs reviewed. (see chart for details)      RADIOLOGY  No orders to display         PROCEDURE  Procedures      Physical exam findings discussed with patient. No acute emergent medical condition identified at this time to warrant further testing. Will dispo home with instructions to follow up with PCP tomorrow. Pt agrees with plan of care.     DISPOSITION  Patient discharged in stable condition     CLINICAL IMPRESSION:  The primary encounter diagnosis was Cough. A diagnosis of Acute non-recurrent maxillary sinusitis was also pertinent to this visit.    Patient advised to follow-up with your PCP within 3 days for BP re-check if Blood Pressure was >120/80 without history of hypertension.

## 2022-01-19 ENCOUNTER — PATIENT MESSAGE (OUTPATIENT)
Dept: FAMILY MEDICINE | Facility: CLINIC | Age: 37
End: 2022-01-19
Payer: COMMERCIAL

## 2022-03-10 ENCOUNTER — TELEPHONE (OUTPATIENT)
Dept: FAMILY MEDICINE | Facility: CLINIC | Age: 37
End: 2022-03-10
Payer: COMMERCIAL

## 2022-05-28 LAB
25(OH)D3 SERPL-MCNC: 64 NG/ML (ref 30–100)
FOLATE SERPL-MCNC: 6 NG/ML
VIT B12 SERPL-MCNC: 314 PG/ML (ref 200–1100)

## 2022-06-01 ENCOUNTER — TELEPHONE (OUTPATIENT)
Dept: FAMILY MEDICINE | Facility: CLINIC | Age: 37
End: 2022-06-01

## 2022-06-01 NOTE — TELEPHONE ENCOUNTER
----- Message from Celine Obrien, VIANNEY,FNP-C sent at 5/30/2022 10:44 AM CDT -----  Will discuss all labs at next visit - vit D & folate have improved, B12 has dropped a bit and still remains low normal

## 2022-06-08 DIAGNOSIS — M79.7 FIBROMYALGIA: ICD-10-CM

## 2022-06-09 RX ORDER — PREGABALIN 150 MG/1
150 CAPSULE ORAL DAILY
Qty: 90 CAPSULE | Refills: 0 | Status: SHIPPED | OUTPATIENT
Start: 2022-06-09 | End: 2022-06-14 | Stop reason: DRUGHIGH

## 2022-06-14 ENCOUNTER — OFFICE VISIT (OUTPATIENT)
Dept: FAMILY MEDICINE | Facility: CLINIC | Age: 37
End: 2022-06-14
Payer: COMMERCIAL

## 2022-06-14 VITALS
OXYGEN SATURATION: 99 % | SYSTOLIC BLOOD PRESSURE: 122 MMHG | DIASTOLIC BLOOD PRESSURE: 72 MMHG | HEART RATE: 73 BPM | WEIGHT: 142.5 LBS | HEIGHT: 64 IN | BODY MASS INDEX: 24.33 KG/M2

## 2022-06-14 DIAGNOSIS — R79.89 LOW VITAMIN B12 LEVEL: ICD-10-CM

## 2022-06-14 DIAGNOSIS — M79.7 FIBROMYALGIA: Primary | ICD-10-CM

## 2022-06-14 PROCEDURE — 99214 PR OFFICE/OUTPT VISIT, EST, LEVL IV, 30-39 MIN: ICD-10-PCS | Mod: S$GLB,,, | Performed by: NURSE PRACTITIONER

## 2022-06-14 PROCEDURE — 99214 OFFICE O/P EST MOD 30 MIN: CPT | Mod: S$GLB,,, | Performed by: NURSE PRACTITIONER

## 2022-06-14 RX ORDER — PREGABALIN 200 MG/1
200 CAPSULE ORAL DAILY
Qty: 30 CAPSULE | Refills: 0 | Status: SHIPPED | OUTPATIENT
Start: 2022-06-14 | End: 2022-09-13 | Stop reason: SDUPTHER

## 2022-06-14 RX ORDER — CYANOCOBALAMIN 1000 UG/ML
1000 INJECTION, SOLUTION INTRAMUSCULAR; SUBCUTANEOUS
Qty: 1 ML | Refills: 5 | Status: SHIPPED | OUTPATIENT
Start: 2022-06-14 | End: 2023-05-09 | Stop reason: ALTCHOICE

## 2022-06-14 RX ORDER — PREGABALIN 150 MG/1
150 CAPSULE ORAL DAILY
Qty: 90 CAPSULE | Refills: 0 | Status: CANCELLED | OUTPATIENT
Start: 2022-06-14

## 2022-06-14 RX ORDER — DICLOFENAC SODIUM 10 MG/G
2 GEL TOPICAL 4 TIMES DAILY
Qty: 450 G | Refills: 0 | Status: SHIPPED | OUTPATIENT
Start: 2022-06-14 | End: 2023-05-09

## 2022-06-14 NOTE — PROGRESS NOTES
SUBJECTIVE:      Patient ID: Janice Hartmann is a 37 y.o. female.    Chief Complaint: Follow-up (6 month f/u Lyrica)    Presents for med refills & lab review - states she feels she may be in a fibro flare as she has been hurting so badly she has had trouble sleeping at night, concerned about taking more meds or increasing her dosing as she states she has a lot to juggle during the day with work/school/children/family responsibilities; B12 314    Muscle Pain  This is a chronic problem. The current episode started more than 1 year ago. The problem occurs daily. The problem has been gradually worsening. Associated symptoms include arthralgias, fatigue and myalgias. Pertinent negatives include no abdominal pain, chest pain, congestion, coughing, headaches, joint swelling, nausea, neck pain, numbness, sore throat, vomiting or weakness. The symptoms are aggravated by exertion and stress (sitting for extended periods). She has tried NSAIDs, rest, sleep, position changes, relaxation, lying down, heat and walking (Lyrica) for the symptoms. The treatment provided moderate relief.   Fatigue  This is a chronic problem. The current episode started more than 1 month ago. The problem occurs daily. The problem has been unchanged. Associated symptoms include arthralgias, fatigue and myalgias. Pertinent negatives include no abdominal pain, chest pain, congestion, coughing, headaches, joint swelling, nausea, neck pain, numbness, sore throat, vomiting or weakness. The symptoms are aggravated by stress and exertion. She has tried rest, sleep, relaxation, position changes and lying down for the symptoms. The treatment provided mild relief.       Past Surgical History:   Procedure Laterality Date    APPENDECTOMY       SECTION      CHOLECYSTECTOMY  2021    preclampsia      x 2     Family History   Problem Relation Age of Onset    Hypertension Mother     Rheum arthritis Mother     Thyroid disease Mother      Fibromyalgia Mother     Alcohol abuse Father     Cancer Father     Diabetes Mellitus Father     Hypertension Father     Cancer Maternal Grandmother         breast    Breast cancer Maternal Grandmother     Cancer Maternal Grandfather     Cancer Maternal Aunt         breast    Breast cancer Maternal Aunt     Breast cancer Paternal Grandmother       Social History     Socioeconomic History    Marital status:     Number of children: 2   Occupational History    Occupation: /mgr   Tobacco Use    Smoking status: Never Smoker    Smokeless tobacco: Never Used   Substance and Sexual Activity    Alcohol use: No    Drug use: No    Sexual activity: Yes     Partners: Male     Current Outpatient Medications   Medication Sig Dispense Refill    cyclobenzaprine (FLEXERIL) 10 MG tablet Take 1 tablet (10 mg total) by mouth as needed for Muscle spasms. 90 tablet 1    ergocalciferol (ERGOCALCIFEROL) 50,000 unit Cap TAKE 1 CAPSULE BY MOUTH ONE TIME PER WEEK 12 capsule 1    folic acid (FOLVITE) 1 MG tablet TAKE 1 TABLET BY MOUTH EVERY DAY 90 tablet 1    cyanocobalamin 1,000 mcg/mL injection Inject 1 mL (1,000 mcg total) into the skin every 14 (fourteen) days. 1 mL 5    diclofenac sodium (VOLTAREN) 1 % Gel Apply 2 g topically 4 (four) times daily. 450 g 0    pregabalin (LYRICA) 200 MG Cap Take 1 capsule (200 mg total) by mouth once daily. 30 capsule 0     No current facility-administered medications for this visit.     Review of patient's allergies indicates:  No Known Allergies   Past Medical History:   Diagnosis Date    Anxiety and depression 2019    Fibromyalgia 2012    Lupus     states per [place]    Severe preeclampsia 2015     Past Surgical History:   Procedure Laterality Date    APPENDECTOMY       SECTION      CHOLECYSTECTOMY  2021    preclampsia      x 2       Review of Systems   Constitutional: Positive for fatigue. Negative for activity change, appetite  "change and unexpected weight change.   HENT: Negative for congestion, ear pain, hearing loss, postnasal drip, rhinorrhea, sinus pressure, sinus pain, sneezing, sore throat and trouble swallowing.    Eyes: Negative for photophobia, pain, discharge and visual disturbance.   Respiratory: Negative for cough, chest tightness, shortness of breath and wheezing.         SONIA stable on CPAP   Cardiovascular: Negative for chest pain, palpitations and leg swelling.   Gastrointestinal: Negative for abdominal distention, abdominal pain, blood in stool, constipation, diarrhea, nausea and vomiting.   Endocrine: Negative for cold intolerance, heat intolerance, polydipsia and polyuria.   Genitourinary: Negative for difficulty urinating, dysuria, flank pain, frequency, hematuria, menstrual problem, pelvic pain and urgency.   Musculoskeletal: Positive for arthralgias and myalgias. Negative for back pain, joint swelling and neck pain.        States fibro seems to be getting worse & more generalized   Skin: Negative for pallor.   Allergic/Immunologic: Negative for environmental allergies and food allergies.   Neurological: Negative for dizziness, weakness, light-headedness, numbness and headaches.   Hematological: Does not bruise/bleed easily.   Psychiatric/Behavioral: Negative for agitation, confusion, decreased concentration, dysphoric mood and sleep disturbance. The patient is not nervous/anxious.       OBJECTIVE:      Vitals:    06/14/22 1125   BP: 122/72   BP Location: Right arm   Patient Position: Sitting   BP Method: Medium (Manual)   Pulse: 73   SpO2: 99%   Weight: 64.6 kg (142 lb 8 oz)   Height: 5' 4" (1.626 m)     Physical Exam  Vitals and nursing note reviewed.   Constitutional:       General: She is not in acute distress.     Appearance: Normal appearance. She is well-developed and normal weight.      Comments: 9# loss since December visit   HENT:      Head: Normocephalic and atraumatic.      Right Ear: Hearing normal.      " Left Ear: Hearing normal.      Nose: Nose normal. No rhinorrhea.   Eyes:      General: Lids are normal.         Right eye: No discharge.         Left eye: No discharge.      Conjunctiva/sclera: Conjunctivae normal.      Right eye: Right conjunctiva is not injected.      Left eye: Left conjunctiva is not injected.      Pupils: Pupils are equal, round, and reactive to light. Pupils are equal.      Right eye: Pupil is round and reactive.      Left eye: Pupil is round and reactive.   Neck:      Thyroid: No thyromegaly.      Vascular: No JVD.      Trachea: Trachea normal. No tracheal deviation.   Cardiovascular:      Rate and Rhythm: Normal rate and regular rhythm.      Pulses:           Radial pulses are 2+ on the right side and 2+ on the left side.      Heart sounds: Normal heart sounds. No murmur heard.    No friction rub. No gallop.   Pulmonary:      Effort: Pulmonary effort is normal. No respiratory distress.      Breath sounds: Normal breath sounds. No stridor. No decreased breath sounds, wheezing, rhonchi or rales.   Abdominal:      General: Bowel sounds are normal. There is no distension.      Palpations: Abdomen is soft. Abdomen is not rigid.      Tenderness: There is no abdominal tenderness. There is no guarding.   Musculoskeletal:         General: Normal range of motion.      Cervical back: Normal range of motion and neck supple.   Lymphadenopathy:      Cervical: No cervical adenopathy.   Skin:     General: Skin is warm and dry.      Capillary Refill: Capillary refill takes less than 2 seconds.      Coloration: Skin is not pale.      Findings: No lesion or rash.   Neurological:      Mental Status: She is alert and oriented to person, place, and time.      Motor: No atrophy.      Coordination: Coordination normal.      Gait: Gait normal.   Psychiatric:         Attention and Perception: Attention normal. She is attentive.         Mood and Affect: Mood and affect normal.         Speech: Speech normal.          Behavior: Behavior normal.         Thought Content: Thought content normal.         Cognition and Memory: Cognition and memory normal.         Judgment: Judgment normal.        Assessment:       1. Fibromyalgia    2. Low vitamin B12 level        Plan:       Fibromyalgia  -     pregabalin (LYRICA) 200 MG Cap; Take 1 capsule (200 mg total) by mouth once daily.  Dispense: 30 capsule; Refill: 0  -     diclofenac sodium (VOLTAREN) 1 % Gel; Apply 2 g topically 4 (four) times daily.  Dispense: 450 g; Refill: 0    Low vitamin B12 level  -     cyanocobalamin 1,000 mcg/mL injection; Inject 1 mL (1,000 mcg total) into the skin every 14 (fourteen) days.  Dispense: 1 mL; Refill: 5          Follow up in about 3 months (around 9/14/2022) for fibromyalgia.      6/14/2022 VIANNEY Kim, FNP-C

## 2022-07-13 ENCOUNTER — PATIENT MESSAGE (OUTPATIENT)
Dept: FAMILY MEDICINE | Facility: CLINIC | Age: 37
End: 2022-07-13

## 2022-07-13 DIAGNOSIS — M79.7 FIBROMYALGIA: Primary | ICD-10-CM

## 2022-07-14 ENCOUNTER — PATIENT MESSAGE (OUTPATIENT)
Dept: FAMILY MEDICINE | Facility: CLINIC | Age: 37
End: 2022-07-14

## 2022-07-14 RX ORDER — PREGABALIN 200 MG/1
200 CAPSULE ORAL DAILY
Qty: 7 CAPSULE | Refills: 0 | Status: SHIPPED | OUTPATIENT
Start: 2022-07-14 | End: 2022-07-21

## 2022-08-25 ENCOUNTER — PATIENT MESSAGE (OUTPATIENT)
Dept: FAMILY MEDICINE | Facility: CLINIC | Age: 37
End: 2022-08-25

## 2022-08-25 DIAGNOSIS — Z12.31 ENCOUNTER FOR SCREENING MAMMOGRAM FOR MALIGNANT NEOPLASM OF BREAST: Primary | ICD-10-CM

## 2022-09-01 ENCOUNTER — PATIENT MESSAGE (OUTPATIENT)
Dept: FAMILY MEDICINE | Facility: CLINIC | Age: 37
End: 2022-09-01

## 2022-09-13 ENCOUNTER — PATIENT MESSAGE (OUTPATIENT)
Dept: FAMILY MEDICINE | Facility: CLINIC | Age: 37
End: 2022-09-13

## 2022-09-13 DIAGNOSIS — M79.7 FIBROMYALGIA: ICD-10-CM

## 2022-09-15 RX ORDER — PREGABALIN 200 MG/1
200 CAPSULE ORAL DAILY
Qty: 30 CAPSULE | Refills: 0 | Status: SHIPPED | OUTPATIENT
Start: 2022-09-15 | End: 2022-11-14 | Stop reason: SDUPTHER

## 2022-09-20 ENCOUNTER — PATIENT MESSAGE (OUTPATIENT)
Dept: FAMILY MEDICINE | Facility: CLINIC | Age: 37
End: 2022-09-20

## 2022-10-28 DIAGNOSIS — M79.7 FIBROMYALGIA: ICD-10-CM

## 2022-10-31 ENCOUNTER — TELEPHONE (OUTPATIENT)
Dept: FAMILY MEDICINE | Facility: CLINIC | Age: 37
End: 2022-10-31

## 2022-10-31 DIAGNOSIS — Z00.00 ROUTINE HEALTH MAINTENANCE: ICD-10-CM

## 2022-10-31 DIAGNOSIS — Z98.84 HISTORY OF BARIATRIC SURGERY: Primary | ICD-10-CM

## 2022-10-31 DIAGNOSIS — M79.7 FIBROMYALGIA: ICD-10-CM

## 2022-10-31 RX ORDER — PREGABALIN 200 MG/1
CAPSULE ORAL
Qty: 30 CAPSULE | Refills: 0 | OUTPATIENT
Start: 2022-10-31

## 2022-10-31 NOTE — TELEPHONE ENCOUNTER
----- Message from Fidelina Lenz sent at 10/31/2022  2:53 PM CDT -----  Regarding: Med refill  Pt called to make an appointment for med refill (Lyrica).  Celine did not have an available appointment until 1/24/22.  Pt is requesting a 90 day refill be sent to [facility] in [place].

## 2022-10-31 NOTE — TELEPHONE ENCOUNTER
Patient scheduled first available appointment 1/24/2023.   She has been added to your cancellation list.   She is requesting Lyrica refill until then.

## 2022-11-14 ENCOUNTER — PATIENT MESSAGE (OUTPATIENT)
Dept: FAMILY MEDICINE | Facility: CLINIC | Age: 37
End: 2022-11-14

## 2022-11-14 RX ORDER — PREGABALIN 200 MG/1
200 CAPSULE ORAL DAILY
Qty: 30 CAPSULE | Refills: 2 | Status: SHIPPED | OUTPATIENT
Start: 2022-11-14 | End: 2023-02-10 | Stop reason: SDUPTHER

## 2022-11-14 NOTE — TELEPHONE ENCOUNTER
Refill is being given to cover her until next visit due to scheduling difficulty.  Please inform patient no future refills will be given should she cancel this upcoming visit in January. Labs need to be completed before visit as well. They have been ordered to [facility].

## 2023-01-17 ENCOUNTER — PATIENT MESSAGE (OUTPATIENT)
Dept: FAMILY MEDICINE | Facility: CLINIC | Age: 38
End: 2023-01-17

## 2023-01-19 NOTE — TELEPHONE ENCOUNTER
Attempted to call patient this afternoon and left message, to remind her of Tuesday's appointment with [facility]. Was also calling to remind her of her pending lab orders. Mentioned that she does want to be fasting and that they can be completed at [facility].

## 2023-01-24 LAB
25(OH)D3 SERPL-MCNC: 22 NG/ML (ref 30–100)
ALBUMIN SERPL-MCNC: 4 G/DL (ref 3.6–5.1)
ALBUMIN/GLOB SERPL: 2 (CALC) (ref 1–2.5)
ALP SERPL-CCNC: 40 U/L (ref 31–125)
ALT SERPL-CCNC: 12 U/L (ref 6–29)
AST SERPL-CCNC: 12 U/L (ref 10–30)
BASOPHILS # BLD AUTO: 41 CELLS/UL (ref 0–200)
BASOPHILS NFR BLD AUTO: 0.9 %
BILIRUB SERPL-MCNC: 0.8 MG/DL (ref 0.2–1.2)
BUN SERPL-MCNC: 13 MG/DL (ref 7–25)
BUN/CREAT SERPL: ABNORMAL (CALC) (ref 6–22)
CALCIUM SERPL-MCNC: 9 MG/DL (ref 8.6–10.2)
CHLORIDE SERPL-SCNC: 106 MMOL/L (ref 98–110)
CHOLEST SERPL-MCNC: 164 MG/DL
CHOLEST/HDLC SERPL: 2.9 (CALC)
CO2 SERPL-SCNC: 32 MMOL/L (ref 20–32)
CREAT SERPL-MCNC: 0.82 MG/DL (ref 0.5–0.97)
EGFR: 94 ML/MIN/1.73M2
EOSINOPHIL # BLD AUTO: 41 CELLS/UL (ref 15–500)
EOSINOPHIL NFR BLD AUTO: 0.9 %
ERYTHROCYTE [DISTWIDTH] IN BLOOD BY AUTOMATED COUNT: 12 % (ref 11–15)
FERRITIN SERPL-MCNC: 40 NG/ML (ref 16–154)
FOLATE SERPL-MCNC: 4.4 NG/ML
GLOBULIN SER CALC-MCNC: 2 G/DL (CALC) (ref 1.9–3.7)
GLUCOSE SERPL-MCNC: 79 MG/DL (ref 65–99)
HBA1C MFR BLD: 4.5 % OF TOTAL HGB
HCT VFR BLD AUTO: 39.4 % (ref 35–45)
HDLC SERPL-MCNC: 56 MG/DL
HGB BLD-MCNC: 13.4 G/DL (ref 11.7–15.5)
IRON SERPL-MCNC: 140 MCG/DL (ref 40–190)
LDLC SERPL CALC-MCNC: 91 MG/DL (CALC)
LYMPHOCYTES # BLD AUTO: 2097 CELLS/UL (ref 850–3900)
LYMPHOCYTES NFR BLD AUTO: 46.6 %
MAGNESIUM SERPL-MCNC: 2 MG/DL (ref 1.5–2.5)
MCH RBC QN AUTO: 32.5 PG (ref 27–33)
MCHC RBC AUTO-ENTMCNC: 34 G/DL (ref 32–36)
MCV RBC AUTO: 95.6 FL (ref 80–100)
MONOCYTES # BLD AUTO: 248 CELLS/UL (ref 200–950)
MONOCYTES NFR BLD AUTO: 5.5 %
NEUTROPHILS # BLD AUTO: 2075 CELLS/UL (ref 1500–7800)
NEUTROPHILS NFR BLD AUTO: 46.1 %
NONHDLC SERPL-MCNC: 108 MG/DL (CALC)
PLATELET # BLD AUTO: 197 THOUSAND/UL (ref 140–400)
PMV BLD REES-ECKER: 12.2 FL (ref 7.5–12.5)
POTASSIUM SERPL-SCNC: 4.5 MMOL/L (ref 3.5–5.3)
PROT SERPL-MCNC: 6 G/DL (ref 6.1–8.1)
RBC # BLD AUTO: 4.12 MILLION/UL (ref 3.8–5.1)
SODIUM SERPL-SCNC: 141 MMOL/L (ref 135–146)
T4 FREE SERPL-MCNC: 1.1 NG/DL (ref 0.8–1.8)
TRIGL SERPL-MCNC: 79 MG/DL
TSH SERPL-ACNC: 1.01 MIU/L
VIT B12 SERPL-MCNC: 282 PG/ML (ref 200–1100)
VIT B6 SERPL-MCNC: 14.2 NG/ML (ref 2.1–21.7)
WBC # BLD AUTO: 4.5 THOUSAND/UL (ref 3.8–10.8)

## 2023-02-13 ENCOUNTER — PATIENT MESSAGE (OUTPATIENT)
Dept: FAMILY MEDICINE | Facility: CLINIC | Age: 38
End: 2023-02-13

## 2023-05-09 ENCOUNTER — OFFICE VISIT (OUTPATIENT)
Dept: FAMILY MEDICINE | Facility: CLINIC | Age: 38
End: 2023-05-09
Payer: COMMERCIAL

## 2023-05-09 VITALS
SYSTOLIC BLOOD PRESSURE: 124 MMHG | BODY MASS INDEX: 25.56 KG/M2 | DIASTOLIC BLOOD PRESSURE: 68 MMHG | HEART RATE: 64 BPM | OXYGEN SATURATION: 99 % | HEIGHT: 64 IN | WEIGHT: 149.69 LBS

## 2023-05-09 DIAGNOSIS — E53.8 LOW FOLATE: ICD-10-CM

## 2023-05-09 DIAGNOSIS — Z80.3 FAMILY HISTORY OF BREAST CANCER IN FEMALE: ICD-10-CM

## 2023-05-09 DIAGNOSIS — Z00.00 ROUTINE HEALTH MAINTENANCE: Primary | ICD-10-CM

## 2023-05-09 DIAGNOSIS — R79.89 LOW VITAMIN B12 LEVEL: Primary | ICD-10-CM

## 2023-05-09 DIAGNOSIS — Z12.31 ENCOUNTER FOR SCREENING MAMMOGRAM FOR MALIGNANT NEOPLASM OF BREAST: ICD-10-CM

## 2023-05-09 DIAGNOSIS — M79.7 FIBROMYALGIA: ICD-10-CM

## 2023-05-09 DIAGNOSIS — E55.9 VITAMIN D DEFICIENCY: ICD-10-CM

## 2023-05-09 PROCEDURE — 99395 PREV VISIT EST AGE 18-39: CPT | Mod: S$GLB,,, | Performed by: NURSE PRACTITIONER

## 2023-05-09 PROCEDURE — 99395 PR PREVENTIVE VISIT,EST,18-39: ICD-10-PCS | Mod: S$GLB,,, | Performed by: NURSE PRACTITIONER

## 2023-05-09 RX ORDER — FOLIC ACID 1 MG/1
1000 TABLET ORAL DAILY
Qty: 90 TABLET | Refills: 1 | Status: SHIPPED | OUTPATIENT
Start: 2023-05-09

## 2023-05-09 RX ORDER — ERGOCALCIFEROL 1.25 MG/1
50000 CAPSULE ORAL
Qty: 13 CAPSULE | Refills: 1 | Status: SHIPPED | OUTPATIENT
Start: 2023-05-09

## 2023-05-09 RX ORDER — PREGABALIN 200 MG/1
200 CAPSULE ORAL DAILY
Qty: 90 CAPSULE | Refills: 0 | Status: SHIPPED | OUTPATIENT
Start: 2023-05-09 | End: 2023-08-08 | Stop reason: SDUPTHER

## 2023-05-09 RX ORDER — CYANOCOBALAMIN (VITAMIN B-12) 2000 MCG
1 TABLET ORAL DAILY
Qty: 90 TABLET | Refills: 1 | Status: SHIPPED | OUTPATIENT
Start: 2023-05-09

## 2023-05-09 NOTE — PROGRESS NOTES
SUBJECTIVE:      Patient ID: Janice Hartmann is a 38 y.o. female.    Chief Complaint: Follow-up (Medication Refills)    Presents for well exam and lab review - vitamin-D 22, vitamin B12 282, folate 4.4; patient admits she has not been consistent in taking her vitamins          Past Surgical History:   Procedure Laterality Date    APPENDECTOMY       SECTION      CHOLECYSTECTOMY  2021    preclampsia      x 2     Family History   Problem Relation Age of Onset    Hypertension Mother     Rheum arthritis Mother     Thyroid disease Mother     Fibromyalgia Mother     Alcohol abuse Father     Cancer Father     Diabetes Mellitus Father     Hypertension Father     Cancer Maternal Grandmother         breast    Breast cancer Maternal Grandmother     Cancer Maternal Grandfather     Cancer Maternal Aunt         breast    Breast cancer Maternal Aunt     Breast cancer Paternal Grandmother       Social History     Socioeconomic History    Marital status:     Number of children: 2   Occupational History    Occupation: /mgr   Tobacco Use    Smoking status: Never    Smokeless tobacco: Never   Substance and Sexual Activity    Alcohol use: No    Drug use: No    Sexual activity: Yes     Partners: Male     Current Outpatient Medications   Medication Sig Dispense Refill    cyanocobalamin, vitamin B-12, 2,000 mcg Tab Take 1 tablet by mouth once daily. 90 tablet 1    ergocalciferol (ERGOCALCIFEROL) 50,000 unit Cap Take 1 capsule (50,000 Units total) by mouth every 7 days. 13 capsule 1    folic acid (FOLVITE) 1 MG tablet Take 1 tablet (1,000 mcg total) by mouth once daily. 90 tablet 1    pregabalin (LYRICA) 200 MG Cap Take 1 capsule (200 mg total) by mouth once daily. 90 capsule 0     No current facility-administered medications for this visit.     Review of patient's allergies indicates:  No Known Allergies   Past Medical History:   Diagnosis Date    Anxiety and depression 2019    Fibromyalgia  "2012    Lupus     states per [place]    Severe preeclampsia 2015     Past Surgical History:   Procedure Laterality Date    APPENDECTOMY       SECTION      CHOLECYSTECTOMY  2021    preclampsia      x 2       Review of Systems   Constitutional:  Negative for activity change, appetite change, fatigue and unexpected weight change.   HENT:  Negative for congestion, ear pain, hearing loss, postnasal drip, rhinorrhea, sinus pressure, sinus pain, sneezing, sore throat and trouble swallowing.    Eyes:  Negative for photophobia, pain, discharge and visual disturbance.   Respiratory:  Negative for cough, chest tightness, shortness of breath and wheezing.    Cardiovascular:  Negative for chest pain, palpitations and leg swelling.   Gastrointestinal:  Negative for abdominal distention, abdominal pain, blood in stool, constipation, diarrhea, nausea and vomiting.   Endocrine: Positive for cold intolerance. Negative for heat intolerance, polydipsia and polyuria.   Genitourinary:  Negative for difficulty urinating, dysuria, flank pain, frequency, hematuria, menstrual problem, pelvic pain and urgency.   Musculoskeletal:  Positive for arthralgias and myalgias. Negative for back pain, joint swelling and neck pain.   Skin:  Negative for pallor.   Allergic/Immunologic: Negative for environmental allergies and food allergies.   Neurological:  Negative for dizziness, weakness, light-headedness, numbness and headaches.   Hematological:  Does not bruise/bleed easily.   Psychiatric/Behavioral:  Negative for agitation, confusion, decreased concentration, dysphoric mood and sleep disturbance. The patient is not nervous/anxious.     OBJECTIVE:      Vitals:    23 1020   BP: 124/68   BP Location: Right arm   Patient Position: Sitting   BP Method: Medium (Manual)   Pulse: 64   SpO2: 99%   Weight: 67.9 kg (149 lb 11.2 oz)   Height: 5' 4" (1.626 m)     Physical Exam  Vitals and nursing note reviewed.   Constitutional:      "  General: She is not in acute distress.     Appearance: Normal appearance. She is well-developed and normal weight.   HENT:      Head: Normocephalic and atraumatic.      Right Ear: Hearing normal.      Left Ear: Hearing normal.      Nose: Nose normal. No rhinorrhea.   Eyes:      General: Lids are normal.         Right eye: No discharge.         Left eye: No discharge.      Conjunctiva/sclera: Conjunctivae normal.      Right eye: Right conjunctiva is not injected.      Left eye: Left conjunctiva is not injected.      Pupils: Pupils are equal, round, and reactive to light. Pupils are equal.      Right eye: Pupil is round and reactive.      Left eye: Pupil is round and reactive.   Neck:      Thyroid: No thyromegaly.      Vascular: No JVD.      Trachea: Trachea normal. No tracheal deviation.   Cardiovascular:      Rate and Rhythm: Normal rate and regular rhythm.      Pulses:           Radial pulses are 2+ on the right side and 2+ on the left side.      Heart sounds: Normal heart sounds. No murmur heard.    No friction rub. No gallop.   Pulmonary:      Effort: Pulmonary effort is normal. No respiratory distress.      Breath sounds: Normal breath sounds. No stridor. No decreased breath sounds, wheezing, rhonchi or rales.   Abdominal:      General: Bowel sounds are normal. There is no distension.      Palpations: Abdomen is soft. Abdomen is not rigid.      Tenderness: There is no abdominal tenderness. There is no guarding.   Musculoskeletal:         General: Normal range of motion.      Cervical back: Normal range of motion and neck supple.   Lymphadenopathy:      Cervical: No cervical adenopathy.   Skin:     General: Skin is warm and dry.      Capillary Refill: Capillary refill takes less than 2 seconds.      Coloration: Skin is not pale.      Findings: No lesion or rash.   Neurological:      Mental Status: She is alert and oriented to person, place, and time.      Motor: No atrophy.      Coordination: Coordination  normal.      Gait: Gait normal.   Psychiatric:         Attention and Perception: She is attentive.         Speech: Speech normal.         Behavior: Behavior normal.         Thought Content: Thought content normal.         Judgment: Judgment normal.      Assessment:       1. Routine health maintenance    2. Encounter for screening mammogram for malignant neoplasm of breast    3. Family history of breast cancer in female        Plan:       Routine health maintenance  - Lyrica & vitamin replacements refilled in separate encounter  - Limit: red meat, butter, fried foods, cheese, and other foods that have a lot of saturated fat. Consume: lean meats, fish, fruits, vegetables, whole grains, beans, lentils, and nuts.   - Instructed on guidelines recommending 150 minutes per week of brisk exercise and healthy lifestyle. Recommended well screenings (including immunizations) reviewed with patient. Discussed outstanding health maintenance and rationale for completion.    Encounter for screening mammogram for malignant neoplasm of breast  -     Mammo Digital Screening Bilat w/ [facility]; Future; Expected date: 05/09/2023    Family history of breast cancer in female  -     Mammo Digital Screening Bilat w/ [facility]; Future; Expected date: 05/09/2023  -     Ambulatory referral/consult to Hematology / Oncology; Future; Expected date: 05/16/2023            Follow up in about 6 months (around 11/9/2023) for lab review.      5/9/2023 VIANNEY Kim, FNP-C

## 2023-05-17 ENCOUNTER — TELEPHONE (OUTPATIENT)
Dept: HEMATOLOGY/ONCOLOGY | Facility: CLINIC | Age: 38
End: 2023-05-17

## 2023-05-17 DIAGNOSIS — Z91.89 AT HIGH RISK FOR BREAST CANCER: Primary | ICD-10-CM

## 2023-06-02 ENCOUNTER — TELEPHONE (OUTPATIENT)
Dept: HEMATOLOGY/ONCOLOGY | Facility: CLINIC | Age: 38
End: 2023-06-02

## 2023-06-21 ENCOUNTER — TELEPHONE (OUTPATIENT)
Dept: HEMATOLOGY/ONCOLOGY | Facility: CLINIC | Age: 38
End: 2023-06-21

## 2023-06-21 NOTE — TELEPHONE ENCOUNTER
Attempted to contact pt to schedule an appt with [facility]. Referral received from Celine Obrien NP. YANIQUE

## 2023-08-04 ENCOUNTER — TELEPHONE (OUTPATIENT)
Dept: ONCOLOGY | Facility: CLINIC | Age: 38
End: 2023-08-04

## 2023-08-08 ENCOUNTER — PATIENT MESSAGE (OUTPATIENT)
Dept: FAMILY MEDICINE | Facility: CLINIC | Age: 38
End: 2023-08-08

## 2023-08-08 DIAGNOSIS — M79.7 FIBROMYALGIA: ICD-10-CM

## 2023-08-08 RX ORDER — PREGABALIN 200 MG/1
200 CAPSULE ORAL DAILY
Qty: 90 CAPSULE | Refills: 0 | Status: SHIPPED | OUTPATIENT
Start: 2023-08-08 | End: 2023-11-15 | Stop reason: SDUPTHER

## 2023-08-08 NOTE — TELEPHONE ENCOUNTER
Patient last seen: 05/09/23  Scheduled to be seen: 11/09/23  Medication last filled: 05/09/23    Medication pended

## 2023-08-13 ENCOUNTER — PATIENT MESSAGE (OUTPATIENT)
Dept: FAMILY MEDICINE | Facility: CLINIC | Age: 38
End: 2023-08-13

## 2023-08-15 ENCOUNTER — TELEPHONE (OUTPATIENT)
Dept: ONCOLOGY | Facility: CLINIC | Age: 38
End: 2023-08-15

## 2023-11-02 ENCOUNTER — PATIENT MESSAGE (OUTPATIENT)
Dept: FAMILY MEDICINE | Facility: CLINIC | Age: 38
End: 2023-11-02

## 2023-11-06 ENCOUNTER — PATIENT MESSAGE (OUTPATIENT)
Dept: FAMILY MEDICINE | Facility: CLINIC | Age: 38
End: 2023-11-06

## 2023-11-06 DIAGNOSIS — M79.7 FIBROMYALGIA: ICD-10-CM

## 2023-11-15 RX ORDER — PREGABALIN 200 MG/1
200 CAPSULE ORAL DAILY
Qty: 90 CAPSULE | Refills: 0 | Status: SHIPPED | OUTPATIENT
Start: 2023-11-15 | End: 2024-02-06 | Stop reason: SDUPTHER

## 2024-01-04 ENCOUNTER — PATIENT MESSAGE (OUTPATIENT)
Dept: FAMILY MEDICINE | Facility: CLINIC | Age: 39
End: 2024-01-04
Payer: COMMERCIAL

## 2024-01-04 DIAGNOSIS — Z87.898 HX OF MOTION SICKNESS: Primary | ICD-10-CM

## 2024-01-08 RX ORDER — ONDANSETRON 4 MG/1
4 TABLET, ORALLY DISINTEGRATING ORAL EVERY 8 HOURS PRN
Qty: 30 TABLET | Refills: 0 | Status: SHIPPED | OUTPATIENT
Start: 2024-01-08

## 2024-01-08 RX ORDER — SCOLOPAMINE TRANSDERMAL SYSTEM 1 MG/1
1 PATCH, EXTENDED RELEASE TRANSDERMAL
Qty: 4 PATCH | Refills: 0 | Status: SHIPPED | OUTPATIENT
Start: 2024-01-08

## 2024-02-06 ENCOUNTER — OFFICE VISIT (OUTPATIENT)
Dept: FAMILY MEDICINE | Facility: CLINIC | Age: 39
End: 2024-02-06
Payer: COMMERCIAL

## 2024-02-06 VITALS
HEART RATE: 79 BPM | WEIGHT: 139.5 LBS | HEIGHT: 64 IN | BODY MASS INDEX: 23.82 KG/M2 | OXYGEN SATURATION: 99 % | SYSTOLIC BLOOD PRESSURE: 126 MMHG | DIASTOLIC BLOOD PRESSURE: 82 MMHG

## 2024-02-06 DIAGNOSIS — Z98.84 S/P BARIATRIC SURGERY: ICD-10-CM

## 2024-02-06 DIAGNOSIS — N95.1 MENOPAUSAL SYMPTOM: ICD-10-CM

## 2024-02-06 DIAGNOSIS — M79.7 FIBROMYALGIA: Primary | ICD-10-CM

## 2024-02-06 DIAGNOSIS — Z00.00 ROUTINE HEALTH MAINTENANCE: ICD-10-CM

## 2024-02-06 PROCEDURE — 99999 PR PBB SHADOW E&M-EST. PATIENT-LVL III: CPT | Mod: PBBFAC,,, | Performed by: NURSE PRACTITIONER

## 2024-02-06 PROCEDURE — 1160F RVW MEDS BY RX/DR IN RCRD: CPT | Mod: CPTII,S$GLB,, | Performed by: NURSE PRACTITIONER

## 2024-02-06 PROCEDURE — 99214 OFFICE O/P EST MOD 30 MIN: CPT | Mod: S$GLB,,, | Performed by: NURSE PRACTITIONER

## 2024-02-06 PROCEDURE — 3079F DIAST BP 80-89 MM HG: CPT | Mod: CPTII,S$GLB,, | Performed by: NURSE PRACTITIONER

## 2024-02-06 PROCEDURE — 1159F MED LIST DOCD IN RCRD: CPT | Mod: CPTII,S$GLB,, | Performed by: NURSE PRACTITIONER

## 2024-02-06 PROCEDURE — 3074F SYST BP LT 130 MM HG: CPT | Mod: CPTII,S$GLB,, | Performed by: NURSE PRACTITIONER

## 2024-02-06 PROCEDURE — 3008F BODY MASS INDEX DOCD: CPT | Mod: CPTII,S$GLB,, | Performed by: NURSE PRACTITIONER

## 2024-02-06 RX ORDER — PREGABALIN 200 MG/1
200 CAPSULE ORAL 3 TIMES DAILY
Qty: 270 CAPSULE | Refills: 0 | Status: SHIPPED | OUTPATIENT
Start: 2024-02-06

## 2024-02-07 NOTE — PROGRESS NOTES
SUBJECTIVE:      Patient ID: Janice Hartmann is a 38 y.o. female.    Chief Complaint: Follow-up (6 month f/u Fibromyalgia)    Presents for fibromyalgia    Discussed outstanding health maintenance     Muscle Pain  This is a chronic problem. The current episode started more than 1 year ago. The problem occurs daily. The problem has been waxing and waning. Associated symptoms include arthralgias, fatigue and myalgias. Pertinent negatives include no abdominal pain, chest pain, congestion, coughing, headaches, joint swelling, nausea, neck pain, numbness, sore throat, vomiting or weakness. The symptoms are aggravated by exertion and stress (sitting for extended periods). She has tried NSAIDs, rest, sleep, position changes, relaxation, lying down, heat and walking (Lyrica) for the symptoms. The treatment provided moderate relief.   Fatigue  This is a chronic problem. The current episode started more than 1 month ago. The problem occurs intermittently. The problem has been waxing and waning. Associated symptoms include arthralgias, fatigue and myalgias. Pertinent negatives include no abdominal pain, chest pain, congestion, coughing, headaches, joint swelling, nausea, neck pain, numbness, sore throat, vomiting or weakness. The symptoms are aggravated by stress and exertion. She has tried rest, sleep, relaxation, position changes and lying down (vitamins (but inconsistent)) for the symptoms. The treatment provided mild relief.       Past Surgical History:   Procedure Laterality Date    APPENDECTOMY       SECTION      CHOLECYSTECTOMY  2021    preclampsia      x 2     Family History   Problem Relation Age of Onset    Hypertension Mother     Rheum arthritis Mother     Thyroid disease Mother     Fibromyalgia Mother     Alcohol abuse Father     Cancer Father     Diabetes Mellitus Father     Hypertension Father     Cancer Maternal Grandmother         breast    Breast cancer Maternal Grandmother     Cancer Maternal  Grandfather     Cancer Maternal Aunt         breast    Breast cancer Maternal Aunt     Breast cancer Paternal Grandmother       Social History     Socioeconomic History    Marital status:     Number of children: 2   Occupational History    Occupation: /mgr   Tobacco Use    Smoking status: Never    Smokeless tobacco: Never   Substance and Sexual Activity    Alcohol use: No    Drug use: No    Sexual activity: Yes     Partners: Male     Social Determinants of Health     Financial Resource Strain: Low Risk  (5/31/2020)    Overall Financial Resource Strain (CARDIA)     Difficulty of Paying Living Expenses: Not very hard   Food Insecurity: No Food Insecurity (5/31/2020)    Hunger Vital Sign     Worried About Running Out of Food in the Last Year: Never true     Ran Out of Food in the Last Year: Never true   Transportation Needs: No Transportation Needs (5/31/2020)    PRAPARE - Transportation     Lack of Transportation (Medical): No     Lack of Transportation (Non-Medical): No   Physical Activity: Insufficiently Active (5/31/2020)    Exercise Vital Sign     Days of Exercise per Week: 2 days     Minutes of Exercise per Session: 30 min   Stress: No Stress Concern Present (3/2/2020)    [employer] - Occupational Stress Questionnaire     Feeling of Stress : Only a little   Social Connections: Unknown (5/31/2020)    Social Connection and Isolation Panel [NHANES]     Frequency of Communication with Friends and Family: More than three times a week     Frequency of Social Gatherings with Friends and Family: More than three times a week     Active Member of Clubs or Organizations: Yes     Attends Club or Organization Meetings: 1 to 4 times per year     Marital Status:      Current Outpatient Medications   Medication Sig Dispense Refill    cyanocobalamin, vitamin B-12, 2,000 mcg Tab Take 1 tablet by mouth once daily. 90 tablet 1    ergocalciferol (ERGOCALCIFEROL) 50,000 unit Cap  Take 1 capsule (50,000 Units total) by mouth every 7 days. 13 capsule 1    folic acid (FOLVITE) 1 MG tablet Take 1 tablet (1,000 mcg total) by mouth once daily. 90 tablet 1    ondansetron (ZOFRAN-ODT) 4 MG TbDL Take 1 tablet (4 mg total) by mouth every 8 (eight) hours as needed (nausea). 30 tablet 0    scopolamine (TRANSDERM-SCOP) 1.3-1.5 mg (1 mg over 3 days) Place 1 patch onto the skin every 72 hours. 4 patch 0    pregabalin (LYRICA) 200 MG Cap Take 1 capsule (200 mg total) by mouth 3 (three) times daily. 270 capsule 0     No current facility-administered medications for this visit.     Review of patient's allergies indicates:  No Known Allergies   Past Medical History:   Diagnosis Date    Anxiety and depression 2019    Fibromyalgia 2012    Lupus     states per [facility]    Severe preeclampsia 2015     Past Surgical History:   Procedure Laterality Date    APPENDECTOMY       SECTION      CHOLECYSTECTOMY  2021    preclampsia      x 2       Review of Systems   Constitutional:  Positive for fatigue. Negative for activity change, appetite change and unexpected weight change.   HENT:  Negative for congestion, ear pain, hearing loss, postnasal drip, rhinorrhea, sinus pressure, sinus pain, sneezing, sore throat and trouble swallowing.    Eyes:  Negative for photophobia, pain, discharge and visual disturbance.   Respiratory:  Negative for cough, chest tightness, shortness of breath and wheezing.         SONIA stable on CPAP   Cardiovascular:  Negative for chest pain, palpitations and leg swelling.   Gastrointestinal:  Negative for abdominal distention, abdominal pain, blood in stool, constipation, diarrhea, nausea and vomiting.   Endocrine: Negative for cold intolerance, heat intolerance, polydipsia and polyuria.   Genitourinary:  Negative for difficulty urinating, dysuria, flank pain, frequency, hematuria, menstrual problem, pelvic pain and urgency.   Musculoskeletal:  Positive for arthralgias and  "myalgias. Negative for back pain, joint swelling and neck pain.   Skin:  Negative for pallor.   Allergic/Immunologic: Negative for environmental allergies and food allergies.   Neurological:  Negative for dizziness, weakness, light-headedness, numbness and headaches.   Hematological:  Does not bruise/bleed easily.   Psychiatric/Behavioral:  Negative for agitation, confusion, decreased concentration, dysphoric mood and sleep disturbance. The patient is not nervous/anxious.       OBJECTIVE:      Vitals:    02/06/24 0944   BP: 126/82   BP Location: Right arm   Patient Position: Sitting   BP Method: Medium (Manual)   Pulse: 79   SpO2: 99%   Weight: 63.3 kg (139 lb 8 oz)   Height: 5' 4" (1.626 m)     Physical Exam  Vitals and nursing note reviewed.   Constitutional:       General: She is not in acute distress.     Appearance: Normal appearance. She is well-developed and normal weight.   HENT:      Head: Normocephalic and atraumatic.      Right Ear: Hearing normal.      Left Ear: Hearing normal.      Nose: Nose normal. No rhinorrhea.   Eyes:      General: Lids are normal.         Right eye: No discharge.         Left eye: No discharge.      Conjunctiva/sclera: Conjunctivae normal.      Right eye: Right conjunctiva is not injected.      Left eye: Left conjunctiva is not injected.      Pupils: Pupils are equal, round, and reactive to light. Pupils are equal.      Right eye: Pupil is round and reactive.      Left eye: Pupil is round and reactive.   Neck:      Thyroid: No thyromegaly.      Vascular: No JVD.      Trachea: Trachea normal. No tracheal deviation.   Cardiovascular:      Rate and Rhythm: Normal rate and regular rhythm.      Pulses:           Radial pulses are 2+ on the right side and 2+ on the left side.      Heart sounds: Normal heart sounds. No murmur heard.     No friction rub. No gallop.   Pulmonary:      Effort: Pulmonary effort is normal. No respiratory distress.      Breath sounds: Normal breath sounds. No " stridor. No decreased breath sounds, wheezing, rhonchi or rales.   Abdominal:      General: Bowel sounds are normal. There is no distension.      Palpations: Abdomen is soft. Abdomen is not rigid.      Tenderness: There is no abdominal tenderness. There is no guarding.   Musculoskeletal:         General: Normal range of motion.      Cervical back: Normal range of motion and neck supple.   Lymphadenopathy:      Cervical: No cervical adenopathy.   Skin:     General: Skin is warm and dry.      Capillary Refill: Capillary refill takes less than 2 seconds.      Coloration: Skin is not pale.      Findings: No lesion or rash.   Neurological:      Mental Status: She is alert and oriented to person, place, and time.      Motor: No atrophy.      Coordination: Coordination normal.      Gait: Gait normal.   Psychiatric:         Attention and Perception: Attention normal. She is attentive.         Mood and Affect: Mood and affect normal.         Speech: Speech normal.         Behavior: Behavior normal.         Thought Content: Thought content normal.         Cognition and Memory: Cognition and memory normal.         Judgment: Judgment normal.        Assessment:       1. Fibromyalgia    2. Menopausal symptom    3. S/P bariatric surgery    4. Routine health maintenance        Plan:       Fibromyalgia  -     pregabalin (LYRICA) 200 MG Cap; Take 1 capsule (200 mg total) by mouth 3 (three) times daily.  Dispense: 270 capsule; Refill: 0    Menopausal symptom  -     TSH; Future; Expected date: 02/06/2024  -     Estradiol; Future; Expected date: 02/06/2024  -     Testosterone, Free; Future; Expected date: 02/06/2024  -     T4, FREE; Future; Expected date: 02/06/2024    S/P bariatric surgery  -     CBC Auto Differential; Future; Expected date: 02/13/2024  -     Comprehensive Metabolic Panel; Future; Expected date: 02/06/2024  -     Lipid Panel; Future; Expected date: 02/06/2024  -     TSH; Future; Expected date: 02/06/2024  -      Ferritin; Future; Expected date: 02/06/2024  -     Iron; Future; Expected date: 02/06/2024  -     Vitamin D; Future; Expected date: 02/06/2024  -     Vitamin B12; Future; Expected date: 02/06/2024  -     Folate; Future; Expected date: 02/06/2024  -     Magnesium; Future; Expected date: 02/06/2024  -     Estradiol; Future; Expected date: 02/06/2024  -     Testosterone, Free; Future; Expected date: 02/06/2024  -     T4, FREE; Future; Expected date: 02/06/2024  -     VITAMIN B6; Future; Expected date: 02/06/2024  -     VITAMIN B1; Future; Expected date: 02/06/2024    Routine health maintenance  -     CBC Auto Differential; Future; Expected date: 02/13/2024  -     Comprehensive Metabolic Panel; Future; Expected date: 02/06/2024  -     Lipid Panel; Future; Expected date: 02/06/2024  -     TSH; Future; Expected date: 02/06/2024          Follow up in about 9 months (around 11/6/2024) for well exam.      2/7/2024 VIANNEY Kim, FNP-C

## 2024-04-29 ENCOUNTER — PATIENT OUTREACH (OUTPATIENT)
Dept: ADMINISTRATIVE | Facility: HOSPITAL | Age: 39
End: 2024-04-29
Payer: COMMERCIAL

## 2024-04-29 NOTE — PROGRESS NOTES
Population Health Chart Review & Patient Outreach Details      Additional [facility] Notes:               Updates Requested / Reviewed:      Updated Care Coordination Note, Care Everywhere, , External Sources: [facility] and [facility], and Immunizations Reconciliation Completed or Queried: [facility] Topics Overdue:      [facility] Score: 1     Cervical Cancer Screening                       Health Maintenance Topic(s) Outreach Outcomes & Actions Taken:    Cervical Cancer Screening - Outreach Outcomes & Actions Taken  : Pap Smear/HPV Scheduled in Primary Care or OBGYN

## 2024-07-25 NOTE — TELEPHONE ENCOUNTER
----- Message from VIANNEY Francisco,FNP-C sent at 3/10/2022  3:42 PM CST -----  Please remind her to have her vitamin labs drawn     Yes

## 2024-10-26 ENCOUNTER — PATIENT MESSAGE (OUTPATIENT)
Dept: FAMILY MEDICINE | Facility: CLINIC | Age: 39
End: 2024-10-26
Payer: COMMERCIAL

## 2024-10-26 DIAGNOSIS — M79.7 FIBROMYALGIA: ICD-10-CM

## 2024-10-31 RX ORDER — PREGABALIN 200 MG/1
200 CAPSULE ORAL 3 TIMES DAILY
Qty: 270 CAPSULE | Refills: 0 | Status: SHIPPED | OUTPATIENT
Start: 2024-10-31

## 2024-11-21 ENCOUNTER — PATIENT MESSAGE (OUTPATIENT)
Dept: FAMILY MEDICINE | Facility: CLINIC | Age: 39
End: 2024-11-21
Payer: COMMERCIAL

## 2025-01-02 ENCOUNTER — PATIENT MESSAGE (OUTPATIENT)
Dept: ADMINISTRATIVE | Facility: HOSPITAL | Age: 40
End: 2025-01-02
Payer: COMMERCIAL

## 2025-01-14 ENCOUNTER — PATIENT MESSAGE (OUTPATIENT)
Dept: ADMINISTRATIVE | Facility: HOSPITAL | Age: 40
End: 2025-01-14
Payer: COMMERCIAL

## 2025-01-17 ENCOUNTER — PATIENT MESSAGE (OUTPATIENT)
Dept: FAMILY MEDICINE | Facility: CLINIC | Age: 40
End: 2025-01-17
Payer: COMMERCIAL

## 2025-02-09 ENCOUNTER — PATIENT MESSAGE (OUTPATIENT)
Dept: FAMILY MEDICINE | Facility: CLINIC | Age: 40
End: 2025-02-09
Payer: COMMERCIAL

## 2025-02-09 DIAGNOSIS — Z98.84 S/P BARIATRIC SURGERY: ICD-10-CM

## 2025-02-09 DIAGNOSIS — Z00.00 ROUTINE HEALTH MAINTENANCE: ICD-10-CM

## 2025-02-09 DIAGNOSIS — N95.1 MENOPAUSAL SYMPTOM: ICD-10-CM

## 2025-02-27 ENCOUNTER — HOSPITAL ENCOUNTER (OUTPATIENT)
Dept: RADIOLOGY | Facility: HOSPITAL | Age: 40
Discharge: HOME | End: 2025-02-27
Attending: NURSE PRACTITIONER
Payer: COMMERCIAL

## 2025-02-27 ENCOUNTER — RESULTS FOLLOW-UP (OUTPATIENT)
Dept: FAMILY MEDICINE | Facility: CLINIC | Age: 40
End: 2025-02-27

## 2025-02-27 VITALS — BODY MASS INDEX: 23.73 KG/M2 | WEIGHT: 139 LBS | HEIGHT: 64 IN

## 2025-02-27 DIAGNOSIS — Z12.31 ENCOUNTER FOR SCREENING MAMMOGRAM FOR BREAST CANCER: ICD-10-CM

## 2025-02-27 PROCEDURE — 77063 BREAST TOMOSYNTHESIS BI: CPT | Mod: 26,,, | Performed by: RADIOLOGY

## 2025-02-27 PROCEDURE — 77067 SCR MAMMO BI INCL CAD: CPT | Mod: 26,,, | Performed by: RADIOLOGY

## 2025-02-27 PROCEDURE — 77067 SCR MAMMO BI INCL CAD: CPT | Mod: TC,PO

## 2025-02-28 LAB
25(OH)D3+25(OH)D2 SERPL-MCNC: 15 NG/ML (ref 30–100)
ALBUMIN SERPL-MCNC: 4 G/DL (ref 3.6–5.1)
ALBUMIN/GLOB SERPL: 1.7 (CALC) (ref 1–2.5)
ALP SERPL-CCNC: 57 U/L (ref 31–125)
ALT SERPL-CCNC: 13 U/L (ref 6–29)
AST SERPL-CCNC: 17 U/L (ref 10–30)
BASOPHILS # BLD AUTO: 29 CELLS/UL (ref 0–200)
BASOPHILS NFR BLD AUTO: 0.7 %
BILIRUB SERPL-MCNC: 0.6 MG/DL (ref 0.2–1.2)
BUN SERPL-MCNC: 8 MG/DL (ref 7–25)
BUN/CREAT SERPL: NORMAL (CALC) (ref 6–22)
CALCIUM SERPL-MCNC: 9.2 MG/DL (ref 8.6–10.2)
CHLORIDE SERPL-SCNC: 106 MMOL/L (ref 98–110)
CHOLEST SERPL-MCNC: 161 MG/DL
CHOLEST/HDLC SERPL: 2.7 (CALC)
CO2 SERPL-SCNC: 28 MMOL/L (ref 20–32)
CREAT SERPL-MCNC: 0.84 MG/DL (ref 0.5–0.99)
EGFR: 90 ML/MIN/1.73M2
EOSINOPHIL # BLD AUTO: 29 CELLS/UL (ref 15–500)
EOSINOPHIL NFR BLD AUTO: 0.7 %
ERYTHROCYTE [DISTWIDTH] IN BLOOD BY AUTOMATED COUNT: 12.6 % (ref 11–15)
ESTRADIOL SERPL-MCNC: 48 PG/ML
FERRITIN SERPL-MCNC: 14 NG/ML (ref 16–154)
FOLATE SERPL-MCNC: 5.6 NG/ML
GLOBULIN SER CALC-MCNC: 2.3 G/DL (CALC) (ref 1.9–3.7)
GLUCOSE SERPL-MCNC: 73 MG/DL (ref 65–99)
HCT VFR BLD AUTO: 40.8 % (ref 35–45)
HDLC SERPL-MCNC: 60 MG/DL
HGB BLD-MCNC: 13.2 G/DL (ref 11.7–15.5)
IRON SERPL-MCNC: 110 MCG/DL (ref 40–190)
LDLC SERPL CALC-MCNC: 86 MG/DL (CALC)
LYMPHOCYTES # BLD AUTO: 1634 CELLS/UL (ref 850–3900)
LYMPHOCYTES NFR BLD AUTO: 38.9 %
MAGNESIUM SERPL-MCNC: 2.1 MG/DL (ref 1.5–2.5)
MCH RBC QN AUTO: 31.4 PG (ref 27–33)
MCHC RBC AUTO-ENTMCNC: 32.4 G/DL (ref 32–36)
MCV RBC AUTO: 97.1 FL (ref 80–100)
MONOCYTES # BLD AUTO: 248 CELLS/UL (ref 200–950)
MONOCYTES NFR BLD AUTO: 5.9 %
NEUTROPHILS # BLD AUTO: 2260 CELLS/UL (ref 1500–7800)
NEUTROPHILS NFR BLD AUTO: 53.8 %
NONHDLC SERPL-MCNC: 101 MG/DL (CALC)
PLATELET # BLD AUTO: 199 THOUSAND/UL (ref 140–400)
PMV BLD REES-ECKER: 12.2 FL (ref 7.5–12.5)
POTASSIUM SERPL-SCNC: 4.7 MMOL/L (ref 3.5–5.3)
PROT SERPL-MCNC: 6.3 G/DL (ref 6.1–8.1)
PYRIDOXAL PHOS SERPL-MCNC: NORMAL UG/L
RBC # BLD AUTO: 4.2 MILLION/UL (ref 3.8–5.1)
SODIUM SERPL-SCNC: 142 MMOL/L (ref 135–146)
T4 FREE SERPL-MCNC: 1.3 NG/DL (ref 0.8–1.8)
TESTOST FREE SERPL-MCNC: NORMAL PG/ML
TRIGL SERPL-MCNC: 61 MG/DL
TSH SERPL-ACNC: 1.09 MIU/L
VIT B1 BLD-SCNC: NORMAL NMOL/L
VIT B12 SERPL-MCNC: 295 PG/ML (ref 200–1100)
WBC # BLD AUTO: 4.2 THOUSAND/UL (ref 3.8–10.8)

## 2025-03-25 ENCOUNTER — RESULTS FOLLOW-UP (OUTPATIENT)
Dept: FAMILY MEDICINE | Facility: CLINIC | Age: 40
End: 2025-03-25

## 2025-04-03 ENCOUNTER — PATIENT MESSAGE (OUTPATIENT)
Dept: ADMINISTRATIVE | Facility: HOSPITAL | Age: 40
End: 2025-04-03
Payer: COMMERCIAL

## 2025-04-16 ENCOUNTER — OFFICE VISIT (OUTPATIENT)
Dept: FAMILY MEDICINE | Facility: CLINIC | Age: 40
End: 2025-04-16
Payer: COMMERCIAL

## 2025-04-16 VITALS
BODY MASS INDEX: 25.14 KG/M2 | WEIGHT: 147.25 LBS | OXYGEN SATURATION: 99 % | HEIGHT: 64 IN | DIASTOLIC BLOOD PRESSURE: 74 MMHG | HEART RATE: 55 BPM | SYSTOLIC BLOOD PRESSURE: 124 MMHG

## 2025-04-16 DIAGNOSIS — M79.7 FIBROMYALGIA: ICD-10-CM

## 2025-04-16 DIAGNOSIS — R79.0 LOW FERRITIN: Primary | ICD-10-CM

## 2025-04-16 DIAGNOSIS — M25.551 RIGHT HIP PAIN: ICD-10-CM

## 2025-04-16 DIAGNOSIS — R53.81 MALAISE: ICD-10-CM

## 2025-04-16 DIAGNOSIS — Z87.898 HX OF MOTION SICKNESS: ICD-10-CM

## 2025-04-16 DIAGNOSIS — E55.9 VITAMIN D DEFICIENCY: ICD-10-CM

## 2025-04-16 DIAGNOSIS — Z00.00 ROUTINE HEALTH MAINTENANCE: Primary | ICD-10-CM

## 2025-04-16 DIAGNOSIS — E53.8 LOW FOLATE: ICD-10-CM

## 2025-04-16 DIAGNOSIS — E53.8 LOW SERUM VITAMIN B12: ICD-10-CM

## 2025-04-16 PROCEDURE — 99999 PR PBB SHADOW E&M-EST. PATIENT-LVL IV: CPT | Mod: PBBFAC,,, | Performed by: NURSE PRACTITIONER

## 2025-04-16 RX ORDER — PREGABALIN 200 MG/1
200 CAPSULE ORAL 3 TIMES DAILY
Qty: 270 CAPSULE | Refills: 0 | Status: SHIPPED | OUTPATIENT
Start: 2025-04-16

## 2025-04-16 RX ORDER — FOLIC ACID 1 MG/1
1000 TABLET ORAL DAILY
Qty: 90 TABLET | Refills: 1 | Status: SHIPPED | OUTPATIENT
Start: 2025-04-16

## 2025-04-16 RX ORDER — CYCLOBENZAPRINE HCL 10 MG
1 TABLET ORAL 3 TIMES DAILY PRN
COMMUNITY
Start: 2025-04-10 | End: 2025-04-20

## 2025-04-16 RX ORDER — ONDANSETRON 4 MG/1
4 TABLET, ORALLY DISINTEGRATING ORAL EVERY 8 HOURS PRN
Qty: 30 TABLET | Refills: 0 | Status: SHIPPED | OUTPATIENT
Start: 2025-04-16

## 2025-04-16 RX ORDER — ERGOCALCIFEROL 1.25 MG/1
50000 CAPSULE ORAL
Qty: 26 CAPSULE | Refills: 1 | Status: SHIPPED | OUTPATIENT
Start: 2025-04-17

## 2025-04-16 NOTE — PROGRESS NOTES
SUBJECTIVE:      Patient ID: Janice Hartmann is a 39 y.o. female.    Chief Complaint: Follow-up (Med Refills)    Presents for well exam and lab review - B12 low normal, ferritin below normal, vit D below normal. Pt admits she has not been doing well with remembering to take her vitamins. Lost to follow-up since 2024.    Discussed outstanding health maintenance - name given for ob/gyn in the area            Past Surgical History:   Procedure Laterality Date    APPENDECTOMY       SECTION      CHOLECYSTECTOMY  2021    preclampsia      x 2     Family History   Problem Relation Name Age of Onset    Breast cancer Mother      Hypertension Mother      Rheum arthritis Mother      Thyroid disease Mother      Fibromyalgia Mother      Alcohol abuse Father      Cancer Father      Diabetes Mellitus Father      Hypertension Father      Cancer Maternal Aunt          breast    Breast cancer Maternal Aunt      Cancer Maternal Grandmother          breast    Breast cancer Maternal Grandmother      Cancer Maternal Grandfather      Breast cancer Paternal Grandmother        Social History     Socioeconomic History    Marital status:     Number of children: 2   Occupational History    Occupation: /mgr   Tobacco Use    Smoking status: Never    Smokeless tobacco: Never   Substance and Sexual Activity    Alcohol use: No    Drug use: No    Sexual activity: Yes     Partners: Male     Social Drivers of Health     Financial Resource Strain: Low Risk  (2020)    Overall Financial Resource Strain (CARDIA)     Difficulty of Paying Living Expenses: Not very hard   Food Insecurity: No Food Insecurity (2020)    Hunger Vital Sign     Worried About Running Out of Food in the Last Year: Never true     Ran Out of Food in the Last Year: Never true   Transportation Needs: No Transportation Needs (2020)    PRAPARE - Transportation     Lack of Transportation (Medical): No     Lack of Transportation  (Non-Medical): No   Physical Activity: Insufficiently Active (2020)    Exercise Vital Sign     Days of Exercise per Week: 2 days     Minutes of Exercise per Session: 30 min   Stress: No Stress Concern Present (3/2/2020)    [employer] [place] [employer] - Occupational Stress Questionnaire     Feeling of Stress : Only a little     Current Outpatient Medications   Medication Sig Dispense Refill    cyanocobalamin, vitamin B-12, 2,000 mcg Tab Take 1 tablet by mouth once daily. 90 tablet 1    cyclobenzaprine (FLEXERIL) 10 MG tablet Take 1 tablet by mouth 3 times daily as needed.      scopolamine (TRANSDERM-SCOP) 1.3-1.5 mg (1 mg over 3 days) Place 1 patch onto the skin every 72 hours. 4 patch 0    [START ON 2025] ergocalciferol (ERGOCALCIFEROL) 50,000 unit Cap Take 1 capsule (50,000 Units total) by mouth twice a week. 26 capsule 1    folic acid (FOLVITE) 1 MG tablet Take 1 tablet (1,000 mcg total) by mouth once daily. 90 tablet 1    ondansetron (ZOFRAN-ODT) 4 MG TbDL Take 1 tablet (4 mg total) by mouth every 8 (eight) hours as needed (nausea). 30 tablet 0    pregabalin (LYRICA) 200 MG Cap Take 1 capsule (200 mg total) by mouth 3 (three) times daily. 270 capsule 0     No current facility-administered medications for this visit.     Review of patient's allergies indicates:  No Known Allergies   Past Medical History:   Diagnosis Date    Anxiety and depression 2019    Fibromyalgia 2012    Lupus     states per [facility]    Severe preeclampsia 2015     Past Surgical History:   Procedure Laterality Date    APPENDECTOMY       SECTION      CHOLECYSTECTOMY  2021    preclampsia      x 2       Review of Systems   Constitutional:  Positive for fatigue. Negative for activity change, appetite change and unexpected weight change.   HENT:  Negative for congestion, ear pain, hearing loss, postnasal drip, rhinorrhea, sinus pressure, sinus pain, sneezing, sore throat and trouble swallowing.   "  Eyes:  Negative for photophobia, pain, discharge and visual disturbance.   Respiratory:  Negative for cough, chest tightness, shortness of breath and wheezing.    Cardiovascular:  Negative for chest pain, palpitations and leg swelling.   Gastrointestinal:  Negative for abdominal distention, abdominal pain, blood in stool, constipation, diarrhea, nausea and vomiting.   Endocrine: Negative for cold intolerance, heat intolerance, polydipsia and polyuria.   Genitourinary:  Negative for difficulty urinating, dysuria, flank pain, frequency, hematuria, menstrual problem, pelvic pain and urgency.   Musculoskeletal:  Positive for arthralgias (R hip) and myalgias. Negative for back pain, joint swelling and neck pain.   Skin:  Negative for pallor.   Allergic/Immunologic: Negative for environmental allergies and food allergies.   Neurological:  Negative for dizziness, weakness, light-headedness, numbness and headaches.   Hematological:  Does not bruise/bleed easily.   Psychiatric/Behavioral:  Positive for dysphoric mood and sleep disturbance. Negative for agitation, confusion and decreased concentration. The patient is not nervous/anxious.       OBJECTIVE:      Vitals:    04/16/25 1039   BP: 124/74   BP Location: Right arm   Patient Position: Sitting   Pulse: (!) 55   SpO2: 99%   Weight: 66.8 kg (147 lb 4.3 oz)   Height: 5' 4" (1.626 m)     Physical Exam  Vitals and nursing note reviewed.   Constitutional:       General: She is not in acute distress.     Appearance: Normal appearance. She is well-developed, well-groomed and normal weight.   HENT:      Head: Normocephalic and atraumatic.      Right Ear: Hearing normal.      Left Ear: Hearing normal.      Nose: Nose normal. No rhinorrhea.   Eyes:      General: Lids are normal.         Right eye: No discharge.         Left eye: No discharge.      Conjunctiva/sclera: Conjunctivae normal.      Right eye: Right conjunctiva is not injected.      Left eye: Left conjunctiva is not " injected.      Pupils: Pupils are equal, round, and reactive to light. Pupils are equal.      Right eye: Pupil is round and reactive.      Left eye: Pupil is round and reactive.   Neck:      Thyroid: No thyromegaly.      Vascular: No JVD.      Trachea: Trachea normal. No tracheal deviation.   Cardiovascular:      Rate and Rhythm: Regular rhythm. Bradycardia present.      Pulses:           Radial pulses are 2+ on the right side and 2+ on the left side.      Heart sounds: Normal heart sounds. No murmur heard.     No friction rub. No gallop.   Pulmonary:      Effort: Pulmonary effort is normal. No respiratory distress.      Breath sounds: Normal breath sounds. No stridor. No decreased breath sounds, wheezing, rhonchi or rales.   Abdominal:      General: Bowel sounds are normal. There is no distension.      Palpations: Abdomen is soft. Abdomen is not rigid.      Tenderness: There is no abdominal tenderness. There is no guarding.   Musculoskeletal:         General: Normal range of motion.      Cervical back: Normal range of motion and neck supple.   Lymphadenopathy:      Cervical: No cervical adenopathy.   Skin:     General: Skin is warm and dry.      Capillary Refill: Capillary refill takes less than 2 seconds.      Coloration: Skin is not pale.      Findings: No lesion or rash.   Neurological:      Mental Status: She is alert and oriented to person, place, and time.      GCS: GCS eye subscore is 4. GCS verbal subscore is 5. GCS motor subscore is 6.      Cranial Nerves: Cranial nerves 2-12 are intact.      Sensory: Sensation is intact.      Motor: Motor function is intact. No atrophy.      Coordination: Coordination is intact. Coordination normal.      Gait: Gait is intact.   Psychiatric:         Attention and Perception: Attention and perception normal. She is attentive.         Mood and Affect: Mood and affect normal.         Speech: Speech normal.         Behavior: Behavior normal.         Thought Content: Thought  content normal.         Cognition and Memory: Cognition and memory normal.         Judgment: Judgment normal.        Assessment:       1. Routine health maintenance        Plan:       Routine health maintenance  - appears stable on current med regimen  - chronic meds refilled in separate encounter  - hip pain addressed in separate encounter, already scheduled to establish with ortho  - continue to follow with appropriate specialists PRN  - Limit: red meat, butter, fried foods, cheese, and other foods that have a lot of saturated fat. Consume: lean meats, fish, fruits, vegetables, whole grains, beans, lentils, and nuts. Maintain healthy weight and adequate daily hydration.  - Patient counseled on age appropriate medical preventive services, age appropriate cancer screenings, nutrition, healthy diet, consistent exercise regimen and maintaining an active lifestyle.  - Instructed on guidelines recommending 150 minutes per week of brisk exercise and healthy lifestyle. Recommended well screenings (including immunizations) reviewed with patient. Discussed outstanding health maintenance and rationale for completion.                  Follow up in about 6 months (around 10/16/2025) for fibromyalgia.      4/16/2025 VIANNEY Kim, FNP-C

## 2025-04-17 ENCOUNTER — TELEPHONE (OUTPATIENT)
Facility: CLINIC | Age: 40
End: 2025-04-17
Payer: COMMERCIAL

## 2025-04-17 NOTE — NURSING
Oncology Navigation   Intake  Date of Diagnosis: 04/16/25  Cancer Type: Benign hem  Type of Referral: Internal  Date of Referral: 04/16/25  Initial Nurse Navigator Contact: 04/17/25  Referral to Initial Contact Timeline (days): 1  Appointment Date: 05/01/25  Reason if booked > 7 days after scheduling: Specific provider / access         Scheduled patient with Vanessa Pearce NP. Referral received from Celine Obrien. Diagnosis malaise, low ferritin, low serum vitamin b12. Patient stated she is very fatigued and also would like to discuss genetic testing. Stated she has family history of breast cancer. Patient required AM appointment. Time, date and location reviewed. Contact information given. Encouraged to call with any questions or concerns.

## 2025-05-30 ENCOUNTER — OFFICE VISIT (OUTPATIENT)
Dept: OBSTETRICS AND GYNECOLOGY | Facility: CLINIC | Age: 40
End: 2025-05-30
Payer: COMMERCIAL

## 2025-05-30 VITALS
DIASTOLIC BLOOD PRESSURE: 84 MMHG | BODY MASS INDEX: 25.01 KG/M2 | RESPIRATION RATE: 16 BRPM | WEIGHT: 146.5 LBS | HEIGHT: 64 IN | SYSTOLIC BLOOD PRESSURE: 108 MMHG

## 2025-05-30 DIAGNOSIS — N93.9 ABNORMAL UTERINE BLEEDING (AUB): ICD-10-CM

## 2025-05-30 DIAGNOSIS — Z01.419 WELL WOMAN EXAM: Primary | ICD-10-CM

## 2025-05-30 PROCEDURE — 99999 PR PBB SHADOW E&M-EST. PATIENT-LVL III: CPT | Mod: PBBFAC,,, | Performed by: GENERAL PRACTICE

## 2025-05-30 NOTE — PATIENT INSTRUCTIONS
ENDOMETRIAL BIOPSY    PURPOSE:  Remove tissue from the endometrium (inner lining of the uterus) for microscopic examination.  Microscopic examination can be useful in obtaining a precise diagnosis in cases of:  Abnormal uterine bleeding  Abnormal ultrasound findings  Hormonal problems  Bleeding after menopause  Other suspected problems with the uterus    PROCEDURE:  Generally no anesthesia is used, and the procedure is done in the clinic.  Consider taking ibuprofen or tylenol 30 minutes to an hour before your appointment.  You may be asked to give urine for a pregnancy test.  A speculum is placed in the vagina in order to visualize the cervix.  The cervix will be cleaned with iodine or another solution.  An instrument is used to stabilize the cervix while a thin plastic tube is inserted into the uterine cavity.  If the cervical opening is too narrow, another instrument may be used to gently stretch the opening.  A small amount of suction is applied to the plastic tube while the doctor sweeps the tube around in order to collect the tissue sample.  It is common to experience cramping or sharp pain, nausea, and some light-headedness during the procedure.  The procedure doesn't last long.  You will be able to drive yourself home and participate in most normal activities for the rest of the day.    AFTER THE PROCEDURE:  Bring a panty-liner or pad to wear home.  It is normal to have bloody, watery, and/or black discharge in the days following the biopsy.  Don't put anything in the vagina (no tampons, sex, etc.) for 3 days unless otherwise instructed.  Take ibuprofen or tylenol as needed for cramping.  Your doctor will let you know how he/she will communicate the biopsy results to you (clinic appointment, MyChart, phone call, etc.).  If two weeks go by and you haven't heard about the results, please contact the clinic to ask.    RETURN PRECAUTIONS:  Contact the clinic (MyChart or phone call), call the nurse line  ([phone]), or go to the ER if your symptoms are severe.  Reasons for concern:  Unusually heavy bleeding (1 pad per hour) or swelling  Fever (temp 100.4 or higher), chills, body aches  Foul-smelling vaginal discharge or thick yellow/green discharge  Pain not controlled by tylenol or ibuprofen      Picture from: [email or web address]

## 2025-05-30 NOTE — PROGRESS NOTES
ASSESSMENT AND PLAN   2025  40 y.o.  for [employer] and with AUB (heavy, frequent).  Heavy day of bleeding today.  Recent CBC and TSH wnl.    Discussed possible medical management options:  NSAIDS, progestins, EDWARD, progestin IUD, Lysteda.  Discussed possible surgical interventions:  endometrial ablation, hysterectomy  Rads: pelvic US  Cervical Cancer screening: overdue  Mammogram: up to date  Return to clinic: for PAP and EMBx, continued discussion    Leslie L Weeks, MD [facility]    SUBJECTIVE   2025  Janice Hartmann is a 40 y.o. here for WWE and evaluation of AUB (heavy, frequent, see below).  Hasn't seen GYN for about 10yrs.    G'sP's:   LMP: Patient's last menstrual period was 2025 (exact date).  Relationship:  13yrs, sexually active  Contraception: rhythm  PAP Hx: no h/o abnormals  MMG (25) = negative, recommend 1-yr f/u     OBJECTIVE   PHYSICAL EXAM  Vitals:    25 1031   BP: 108/84   Resp: 16     GEN = alert/oriented, nad, pleasant  HEENT = sclera anicteric, EOM grossly normal  CV = BP and HR as per vitals  PULM = normal respiratory effort   = deferred, no acute concerns    LABS AND RADS    Lab Results   Component Value Date    WBC 4.2 2025    HGB 13.2 2025    HCT 40.8 2025     2025    MCV 97.1 2025      Lab Results   Component Value Date    TSH 1.09 2025      HISTORY   Date taken or verified: 2025     GYNECOLOGIC HISTORY  PAP Hx: no h/o abnormals  Genital HSV: No  Other STD Hx: denies    Menarche: 11yoa  Bleeding -- #Days Bleedin / # Heavy Days: 5 / Product Change on heavy days: often / Intermenstrual Bleeding: No / Cycle: twice a month  --> been going on about a year  Pain -- Dysmenorrhea: yes / Non-menstrual pelvic pain: No / Dyspareunia: denies  Other -- Vasomotor Sxs: denies / Vaginal dryness: denies    OBSTETRIC HISTORY  Living children: 2  C-sections: 2  Ectopics: 1    SOCIAL  HISTORY  Lives with:  and children  Smoker: non-smoker / Alcohol: denies / Drugs: denies  Domestic Violence: No  Occupation: director of Medical Coding    FAMILY HISTORY  BLEEDING or CLOTTING DISORDERS: none  BREAST CA: mother (dx'ed 50) and aunt (was older, now  of other causes) / UTERINE CA: none / OVARIAN CA: maternal grandmother  COLON CA: none     PAST MEDICAL HISTORY  -------------------------------------    Anxiety and depression    Fibromyalgia    Lupus    states per [place]    Severe preeclampsia     PAST SURGICAL HISTORY  ----------------------------    Appendectomy     section    x 2    Cholecystectomy    Salpingectomy    Lx in 2013 for ectopic pregnancy    Sleeve gastroplasty    2019, starting weight 250     ALLERGIES  Review of patient's allergies indicates:  No Known Allergies    MEDICATIONS  Current Outpatient Medications   Medication Instructions    cyanocobalamin, vitamin B-12, 2,000 mcg Tab 1 tablet, Oral, Daily    ergocalciferol (ERGOCALCIFEROL) 50,000 Units, Oral, Twice weekly    folic acid (FOLVITE) 1,000 mcg, Oral, Daily    ondansetron (ZOFRAN-ODT) 4 mg, Oral, Every 8 hours PRN    pregabalin (LYRICA) 200 mg, Oral, 3 times daily    scopolamine (TRANSDERM-SCOP) 1.3-1.5 mg (1 mg over 3 days) 1 patch, Transdermal, Every 72 hours

## 2025-06-06 ENCOUNTER — PATIENT MESSAGE (OUTPATIENT)
Dept: FAMILY MEDICINE | Facility: CLINIC | Age: 40
End: 2025-06-06
Payer: COMMERCIAL

## 2025-06-20 ENCOUNTER — LAB VISIT (OUTPATIENT)
Dept: LAB | Facility: HOSPITAL | Age: 40
End: 2025-06-20
Attending: NURSE PRACTITIONER
Payer: COMMERCIAL

## 2025-06-20 ENCOUNTER — DOCUMENTATION ONLY (OUTPATIENT)
Dept: FAMILY MEDICINE | Facility: CLINIC | Age: 40
End: 2025-06-20

## 2025-06-20 ENCOUNTER — TELEPHONE (OUTPATIENT)
Dept: FAMILY MEDICINE | Facility: CLINIC | Age: 40
End: 2025-06-20
Payer: COMMERCIAL

## 2025-06-20 ENCOUNTER — HOSPITAL ENCOUNTER (OUTPATIENT)
Dept: RADIOLOGY | Facility: HOSPITAL | Age: 40
Discharge: HOME | End: 2025-06-20
Attending: NURSE PRACTITIONER
Payer: COMMERCIAL

## 2025-06-20 ENCOUNTER — OFFICE VISIT (OUTPATIENT)
Dept: FAMILY MEDICINE | Facility: CLINIC | Age: 40
End: 2025-06-20
Payer: COMMERCIAL

## 2025-06-20 ENCOUNTER — HOSPITAL ENCOUNTER (OUTPATIENT)
Dept: RADIOLOGY | Facility: HOSPITAL | Age: 40
Discharge: HOME | End: 2025-06-20
Attending: FAMILY MEDICINE
Payer: COMMERCIAL

## 2025-06-20 VITALS
BODY MASS INDEX: 24.96 KG/M2 | OXYGEN SATURATION: 99 % | SYSTOLIC BLOOD PRESSURE: 94 MMHG | TEMPERATURE: 98 F | WEIGHT: 146.19 LBS | DIASTOLIC BLOOD PRESSURE: 64 MMHG | HEART RATE: 64 BPM | HEIGHT: 64 IN

## 2025-06-20 DIAGNOSIS — M79.89 PAIN AND SWELLING OF LOWER LEG, UNSPECIFIED LATERALITY: Primary | ICD-10-CM

## 2025-06-20 DIAGNOSIS — E61.1 IRON DEFICIENCY: ICD-10-CM

## 2025-06-20 DIAGNOSIS — R07.9 CHEST PAIN, UNSPECIFIED TYPE: Primary | ICD-10-CM

## 2025-06-20 DIAGNOSIS — R06.02 SOB (SHORTNESS OF BREATH): ICD-10-CM

## 2025-06-20 DIAGNOSIS — M79.669 PAIN AND SWELLING OF LOWER LEG, UNSPECIFIED LATERALITY: Primary | ICD-10-CM

## 2025-06-20 DIAGNOSIS — R07.9 CHEST PAIN, UNSPECIFIED TYPE: ICD-10-CM

## 2025-06-20 DIAGNOSIS — M79.89 PAIN AND SWELLING OF LOWER LEG, UNSPECIFIED LATERALITY: ICD-10-CM

## 2025-06-20 DIAGNOSIS — M79.669 PAIN AND SWELLING OF LOWER LEG, UNSPECIFIED LATERALITY: ICD-10-CM

## 2025-06-20 DIAGNOSIS — M25.469 JOINT SWELLING OF LOWER LEG: ICD-10-CM

## 2025-06-20 LAB
ABSOLUTE EOSINOPHIL (OHS): 0.04 K/UL
ABSOLUTE MONOCYTE (OHS): 0.34 K/UL (ref 0.3–1)
ABSOLUTE NEUTROPHIL COUNT (OHS): 3.66 K/UL (ref 1.8–7.7)
ALBUMIN SERPL-MCNC: 3.9 G/DL (ref 3.5–5.2)
ALP SERPL-CCNC: 45 UNIT/L (ref 40–150)
ALT SERPL-CCNC: 12 UNIT/L (ref 10–44)
ANION GAP (SMH): 8 MMOL/L (ref 8–16)
AST SERPL-CCNC: 16 UNIT/L (ref 11–45)
BACTERIA #/AREA URNS AUTO: ABNORMAL /HPF
BASOPHILS # BLD AUTO: 0.05 K/UL
BASOPHILS NFR BLD AUTO: 0.8 %
BILIRUB SERPL-MCNC: 0.4 MG/DL (ref 0.1–1)
BILIRUB UR QL STRIP.AUTO: NEGATIVE
BNP SERPL-MCNC: 58 PG/ML
BUN SERPL-MCNC: 14 MG/DL (ref 6–20)
CALCIUM SERPL-MCNC: 9.2 MG/DL (ref 8.7–10.5)
CHLORIDE SERPL-SCNC: 108 MMOL/L (ref 95–110)
CLARITY UR: CLEAR
CO2 SERPL-SCNC: 24 MMOL/L (ref 23–29)
COLOR UR AUTO: YELLOW
CREAT SERPL-MCNC: 0.9 MG/DL (ref 0.5–1.4)
D DIMER PPP IA.FEU-MCNC: 0.63 MG/L FEU
ERYTHROCYTE [DISTWIDTH] IN BLOOD BY AUTOMATED COUNT: 12.5 % (ref 11.5–14.5)
FERRITIN SERPL-MCNC: 18.4 NG/ML (ref 20–300)
GFR SERPLBLD CREATININE-BSD FMLA CKD-EPI: >60 ML/MIN/1.73/M2
GLUCOSE SERPL-MCNC: 83 MG/DL (ref 70–110)
GLUCOSE UR QL STRIP: NEGATIVE
HCT VFR BLD AUTO: 41.2 % (ref 37–48.5)
HGB BLD-MCNC: 13.7 GM/DL (ref 12–16)
HGB UR QL STRIP: NEGATIVE
HOLD SPECIMEN: NORMAL
IMM GRANULOCYTES # BLD AUTO: 0.02 K/UL (ref 0–0.04)
IMM GRANULOCYTES NFR BLD AUTO: 0.3 % (ref 0–0.5)
IRON SATN MFR SERPL: 21 % (ref 20–50)
IRON SERPL-MCNC: 96 UG/DL (ref 30–160)
KETONES UR QL STRIP: NEGATIVE
LEUKOCYTE ESTERASE UR QL STRIP: ABNORMAL
LYMPHOCYTES # BLD AUTO: 2.19 K/UL (ref 1–4.8)
MAGNESIUM SERPL-MCNC: 2 MG/DL (ref 1.6–2.6)
MCH RBC QN AUTO: 31.6 PG (ref 27–31)
MCHC RBC AUTO-ENTMCNC: 33.3 G/DL (ref 32–36)
MCV RBC AUTO: 95 FL (ref 82–98)
MICROSCOPIC COMMENT: ABNORMAL
NITRITE UR QL STRIP: NEGATIVE
NUCLEATED RBC (/100WBC) (OHS): 0 /100 WBC
PH UR STRIP: 7 [PH]
PLATELET # BLD AUTO: 188 K/UL (ref 150–450)
PMV BLD AUTO: 11.6 FL (ref 9.2–12.9)
POTASSIUM SERPL-SCNC: 4.5 MMOL/L (ref 3.5–5.1)
PROT SERPL-MCNC: 6.7 GM/DL (ref 6–8.4)
PROT UR QL STRIP: NEGATIVE
RBC # BLD AUTO: 4.33 M/UL (ref 4–5.4)
RBC #/AREA URNS AUTO: 1 /HPF (ref 0–4)
RELATIVE EOSINOPHIL (OHS): 0.6 %
RELATIVE LYMPHOCYTE (OHS): 34.8 % (ref 18–48)
RELATIVE MONOCYTE (OHS): 5.4 % (ref 4–15)
RELATIVE NEUTROPHIL (OHS): 58.1 % (ref 38–73)
SODIUM SERPL-SCNC: 140 MMOL/L (ref 136–145)
SP GR UR STRIP: 1.01
SQUAMOUS #/AREA URNS AUTO: 1 /HPF
TIBC SERPL-MCNC: 454 UG/DL (ref 250–450)
TRANSFERRIN SERPL-MCNC: 307 MG/DL (ref 200–375)
TSH SERPL-ACNC: 0.96 UIU/ML (ref 0.4–4)
UROBILINOGEN UR STRIP-ACNC: NEGATIVE EU/DL
WBC # BLD AUTO: 6.3 K/UL (ref 3.9–12.7)
WBC #/AREA URNS AUTO: 9 /HPF (ref 0–5)

## 2025-06-20 PROCEDURE — 93970 EXTREMITY STUDY: CPT | Mod: TC

## 2025-06-20 PROCEDURE — 71275 CT ANGIOGRAPHY CHEST: CPT | Mod: 26,,, | Performed by: RADIOLOGY

## 2025-06-20 PROCEDURE — 93970 EXTREMITY STUDY: CPT | Mod: 26,,, | Performed by: RADIOLOGY

## 2025-06-20 PROCEDURE — 71275 CT ANGIOGRAPHY CHEST: CPT | Mod: TC

## 2025-06-20 PROCEDURE — 81001 URINALYSIS AUTO W/SCOPE: CPT

## 2025-06-20 PROCEDURE — 25500020 PHARM REV CODE 255

## 2025-06-20 PROCEDURE — 85025 COMPLETE CBC W/AUTO DIFF WBC: CPT | Mod: PO

## 2025-06-20 PROCEDURE — 36415 COLL VENOUS BLD VENIPUNCTURE: CPT | Mod: PO

## 2025-06-20 PROCEDURE — 99999 PR PBB SHADOW E&M-EST. PATIENT-LVL IV: CPT | Mod: PBBFAC,,, | Performed by: NURSE PRACTITIONER

## 2025-06-20 RX ADMIN — IOHEXOL 75 ML: 350 INJECTION, SOLUTION INTRAVENOUS at 07:06

## 2025-06-20 NOTE — PROGRESS NOTES
"Subjective:       Patient ID: Janice Hartmann is a 40 y.o. female.    Chief Complaint: bilateral edema in lower extremities and Shortness of Breath     HPI   39 y/o female patient with medical problems listed below presents for bilateral lower legs swelling and pain, and sob for 3 weeks. She states took "fluid pill" once from old prescription which helped reduce swelling (patient does not recall the name of the medication). Denies recent travel. Denies smoking. She states has had legs cramping since 2025. She denies chest pain but describes intermittent pressure in anterior chest, which is not associated with dizziness, cough, wheezing, nausea, abdominal pain, urinary or bowel symptoms, fever, chills.     Labs reviewed 2025- Ferritin low 14 not on iron supplement    Problem List[1]     Review of patient's allergies indicates:  No Known Allergies    Past Surgical History:   Procedure Laterality Date    APPENDECTOMY       SECTION      x 2    CHOLECYSTECTOMY  2021    SALPINGECTOMY      Lx in  for ectopic pregnancy    SLEEVE GASTROPLASTY      2019, starting weight 250      Current Medications[2]    Review of Systems   Constitutional:  Negative for chills and fever.   Respiratory:  Positive for shortness of breath. Negative for cough and wheezing.    Cardiovascular:  Negative for chest pain and palpitations.   Gastrointestinal:  Negative for abdominal pain.   Musculoskeletal:  Positive for joint swelling.   Neurological:  Negative for dizziness and headaches.       Objective:   BP 94/64 (BP Location: Right arm, Patient Position: Sitting)   Pulse 64   Temp 98 °F (36.7 °C) (Oral)   Ht 5' 4" (1.626 m)   Wt 66.3 kg (146 lb 2.6 oz)   LMP 2025 (Exact Date)   SpO2 99%   BMI 25.09 kg/m²      Physical Exam  Constitutional:       General: She is not in acute distress.     Appearance: Normal appearance.   HENT:      Head: Atraumatic.   Cardiovascular:      Rate and Rhythm: Normal rate and regular " rhythm.      Pulses: Normal pulses.      Heart sounds: Normal heart sounds.   Pulmonary:      Effort: Pulmonary effort is normal.      Breath sounds: Normal breath sounds. No wheezing.   Abdominal:      General: Abdomen is flat. Bowel sounds are normal.      Palpations: Abdomen is soft.   Musculoskeletal:      Comments: +bilateral non-pitting edema in b/l lower legs (left > right)   Neurological:      Mental Status: She is alert.         Assessment:       1. Pain and swelling of lower leg, unspecified laterality    2. SOB (shortness of breath)    3. Iron deficiency        Plan:       1. Pain and swelling of lower leg, unspecified laterality (Primary)  - US Lower Extremity Veins Bilateral; Future  - CBC Auto Differential; Future  - Comprehensive Metabolic Panel; Future  - TSH; Future  - BNP; Future  - Urinalysis, Reflex to Urine Culture Urine, Clean Catch; Future    2. SOB (shortness of breath)  - BNP; Future  - D-Dimer, Quantitative; Future  - X-Ray Chest PA And Lateral; Future  - EKG 12-lead: NSR with rate of 65, No ST-T changes   - Magnesium; Future  - Strict ED precautions discussed and patient voiced understanding     3. Iron deficiency  - CBC Auto Differential; Future  - Iron and TIBC; Future  - Ferritin; Future     Will follow up on the test results  Karan NP         [1]   Patient Active Problem List  Diagnosis    Fibromyalgia    Headache in pregnancy    Severe preeclampsia    Diet-controlled gestational diabetes mellitus    SONIA on CPAP    Anxiety and depression    S/P bariatric surgery   [2]   Current Outpatient Medications:     cyanocobalamin, vitamin B-12, 2,000 mcg Tab, Take 1 tablet by mouth once daily., Disp: 90 tablet, Rfl: 1    ergocalciferol (ERGOCALCIFEROL) 50,000 unit Cap, Take 1 capsule (50,000 Units total) by mouth twice a week., Disp: 26 capsule, Rfl: 1    folic acid (FOLVITE) 1 MG tablet, Take 1 tablet (1,000 mcg total) by mouth once daily., Disp: 90 tablet, Rfl: 1    pregabalin (LYRICA) 200 MG  Cap, Take 1 capsule (200 mg total) by mouth 3 (three) times daily., Disp: 270 capsule, Rfl: 0    ondansetron (ZOFRAN-ODT) 4 MG TbDL, Take 1 tablet (4 mg total) by mouth every 8 (eight) hours as needed (nausea). (Patient not taking: Reported on 6/20/2025), Disp: 30 tablet, Rfl: 0    scopolamine (TRANSDERM-SCOP) 1.3-1.5 mg (1 mg over 3 days), Place 1 patch onto the skin every 72 hours. (Patient not taking: Reported on 6/20/2025), Disp: 4 patch, Rfl: 0

## 2025-06-20 NOTE — TELEPHONE ENCOUNTER
----- Message from Svetlana sent at 6/20/2025  9:15 AM CDT -----  Patient is having severe swelling and asking to be seen today. No appointment available on this side.willing to be seen by anyone.    [phone]

## 2025-06-20 NOTE — TELEPHONE ENCOUNTER
Called patient scheduled an appointment to be seen with mrs. Martinez.patient expressed verbal understanding.

## 2025-06-21 NOTE — PROGRESS NOTES
Spoke with patient who noted symptoms going on for weeks. No active or worsening chest pain or shortness of breath. Due to mildly elevated d dimer adjusted for age do feel safe with outpatient workup. Advised go nearest hospital for have CT study done to rule out PE. Discussed emergent symptoms and when to seek emergent care.

## 2025-06-23 ENCOUNTER — TELEPHONE (OUTPATIENT)
Dept: FAMILY MEDICINE | Facility: CLINIC | Age: 40
End: 2025-06-23
Payer: COMMERCIAL

## 2025-06-23 ENCOUNTER — PATIENT MESSAGE (OUTPATIENT)
Dept: FAMILY MEDICINE | Facility: CLINIC | Age: 40
End: 2025-06-23
Payer: COMMERCIAL

## 2025-06-23 ENCOUNTER — RESULTS FOLLOW-UP (OUTPATIENT)
Dept: FAMILY MEDICINE | Facility: CLINIC | Age: 40
End: 2025-06-23
Payer: COMMERCIAL

## 2025-06-23 ENCOUNTER — RESULTS FOLLOW-UP (OUTPATIENT)
Dept: FAMILY MEDICINE | Facility: CLINIC | Age: 40
End: 2025-06-23

## 2025-06-23 DIAGNOSIS — R07.9 CHEST PAIN, UNSPECIFIED TYPE: Primary | ICD-10-CM

## 2025-06-23 DIAGNOSIS — R06.02 SOB (SHORTNESS OF BREATH): ICD-10-CM

## 2025-06-23 DIAGNOSIS — M79.89 PAIN AND SWELLING OF LOWER LEG, UNSPECIFIED LATERALITY: ICD-10-CM

## 2025-06-23 DIAGNOSIS — M79.669 PAIN AND SWELLING OF LOWER LEG, UNSPECIFIED LATERALITY: ICD-10-CM

## 2025-06-24 ENCOUNTER — HOSPITAL ENCOUNTER (OUTPATIENT)
Dept: RADIOLOGY | Facility: HOSPITAL | Age: 40
Discharge: HOME | End: 2025-06-24
Attending: NURSE PRACTITIONER
Payer: COMMERCIAL

## 2025-06-24 ENCOUNTER — RESULTS FOLLOW-UP (OUTPATIENT)
Dept: FAMILY MEDICINE | Facility: CLINIC | Age: 40
End: 2025-06-24

## 2025-06-24 ENCOUNTER — HOSPITAL ENCOUNTER (OUTPATIENT)
Dept: CARDIOLOGY | Facility: HOSPITAL | Age: 40
Discharge: HOME | End: 2025-06-24
Attending: NURSE PRACTITIONER
Payer: COMMERCIAL

## 2025-06-24 DIAGNOSIS — M79.89 PAIN AND SWELLING OF LOWER LEG, UNSPECIFIED LATERALITY: ICD-10-CM

## 2025-06-24 DIAGNOSIS — M79.669 PAIN AND SWELLING OF LOWER LEG, UNSPECIFIED LATERALITY: ICD-10-CM

## 2025-06-24 PROCEDURE — 93925 LOWER EXTREMITY STUDY: CPT | Mod: TC

## 2025-06-24 PROCEDURE — 93922 UPR/L XTREMITY ART 2 LEVELS: CPT | Mod: 26,,, | Performed by: RADIOLOGY

## 2025-06-24 PROCEDURE — 93925 LOWER EXTREMITY STUDY: CPT | Mod: 26,,, | Performed by: RADIOLOGY

## 2025-06-25 LAB
OHS QRS DURATION: 74 MS
OHS QTC CALCULATION: 399 MS

## 2025-06-27 ENCOUNTER — HOSPITAL ENCOUNTER (OUTPATIENT)
Facility: HOSPITAL | Age: 40
Discharge: LEFT BEFORE DISCHARGE | End: 2025-06-28
Attending: STUDENT IN AN ORGANIZED HEALTH CARE EDUCATION/TRAINING PROGRAM | Admitting: INTERNAL MEDICINE
Payer: COMMERCIAL

## 2025-06-27 DIAGNOSIS — R07.9 CHEST PAIN: ICD-10-CM

## 2025-06-27 DIAGNOSIS — M79.89 LEG SWELLING: ICD-10-CM

## 2025-06-27 DIAGNOSIS — R06.02 SHORTNESS OF BREATH: Primary | ICD-10-CM

## 2025-06-27 DIAGNOSIS — I50.9 CHF (CONGESTIVE HEART FAILURE): ICD-10-CM

## 2025-06-27 LAB
ABSOLUTE EOSINOPHIL (SMH): 0.06 K/UL
ABSOLUTE MONOCYTE (SMH): 0.41 K/UL (ref 0.3–1)
ABSOLUTE NEUTROPHIL COUNT (SMH): 3.2 K/UL (ref 1.8–7.7)
ALBUMIN SERPL-MCNC: 4.1 G/DL (ref 3.5–5.2)
ALP SERPL-CCNC: 39 UNIT/L (ref 55–135)
ALT SERPL-CCNC: 9 UNIT/L (ref 10–44)
ANION GAP (SMH): 5 MMOL/L (ref 8–16)
AST SERPL-CCNC: 14 UNIT/L (ref 10–40)
B-HCG UR QL: NEGATIVE
BASOPHILS # BLD AUTO: 0.06 K/UL
BASOPHILS NFR BLD AUTO: 0.9 %
BILIRUB SERPL-MCNC: 0.4 MG/DL (ref 0.1–1)
BNP SERPL-MCNC: 17 PG/ML
BUN SERPL-MCNC: 18 MG/DL (ref 6–20)
CALCIUM SERPL-MCNC: 9 MG/DL (ref 8.7–10.5)
CHLORIDE SERPL-SCNC: 110 MMOL/L (ref 95–110)
CO2 SERPL-SCNC: 24 MMOL/L (ref 23–29)
CREAT SERPL-MCNC: 1.1 MG/DL (ref 0.5–1.4)
CTP QC/QA: YES
ERYTHROCYTE [DISTWIDTH] IN BLOOD BY AUTOMATED COUNT: 12.4 % (ref 11.5–14.5)
GFR SERPLBLD CREATININE-BSD FMLA CKD-EPI: >60 ML/MIN/1.73/M2
GLUCOSE SERPL-MCNC: 90 MG/DL (ref 70–110)
HCT VFR BLD AUTO: 39.9 % (ref 37–48.5)
HGB BLD-MCNC: 13.3 GM/DL (ref 12–16)
IMM GRANULOCYTES # BLD AUTO: 0.02 K/UL (ref 0–0.04)
IMM GRANULOCYTES NFR BLD AUTO: 0.3 % (ref 0–0.5)
INR PPP: 0.9 (ref 0.8–1.2)
LYMPHOCYTES # BLD AUTO: 2.77 K/UL (ref 1–4.8)
MAGNESIUM SERPL-MCNC: 2 MG/DL (ref 1.6–2.6)
MCH RBC QN AUTO: 31.8 PG (ref 27–31)
MCHC RBC AUTO-ENTMCNC: 33.3 G/DL (ref 32–36)
MCV RBC AUTO: 96 FL (ref 82–98)
NUCLEATED RBC (/100WBC) (SMH): 0 /100 WBC
PLATELET # BLD AUTO: 194 K/UL (ref 150–450)
PMV BLD AUTO: 11.9 FL (ref 9.2–12.9)
POTASSIUM SERPL-SCNC: 4.1 MMOL/L (ref 3.5–5.1)
PROT SERPL-MCNC: 6.6 GM/DL (ref 6–8.4)
PROTHROMBIN TIME: 10.6 SECONDS (ref 9–12.5)
RBC # BLD AUTO: 4.18 M/UL (ref 4–5.4)
RELATIVE EOSINOPHIL (SMH): 0.9 % (ref 0–8)
RELATIVE LYMPHOCYTE (SMH): 42.2 % (ref 18–48)
RELATIVE MONOCYTE (SMH): 6.3 % (ref 4–15)
RELATIVE NEUTROPHIL (SMH): 49.4 % (ref 38–73)
SODIUM SERPL-SCNC: 139 MMOL/L (ref 136–145)
TROPONIN HIGH SENSITIVE (SMH): <2.3 PG/ML
WBC # BLD AUTO: 6.56 K/UL (ref 3.9–12.7)

## 2025-06-27 PROCEDURE — 84484 ASSAY OF TROPONIN QUANT: CPT | Mod: 91 | Performed by: STUDENT IN AN ORGANIZED HEALTH CARE EDUCATION/TRAINING PROGRAM

## 2025-06-27 PROCEDURE — 83735 ASSAY OF MAGNESIUM: CPT | Performed by: STUDENT IN AN ORGANIZED HEALTH CARE EDUCATION/TRAINING PROGRAM

## 2025-06-27 PROCEDURE — 85025 COMPLETE CBC W/AUTO DIFF WBC: CPT | Performed by: STUDENT IN AN ORGANIZED HEALTH CARE EDUCATION/TRAINING PROGRAM

## 2025-06-27 PROCEDURE — 99285 EMERGENCY DEPT VISIT HI MDM: CPT | Mod: 25

## 2025-06-27 PROCEDURE — 85610 PROTHROMBIN TIME: CPT | Performed by: STUDENT IN AN ORGANIZED HEALTH CARE EDUCATION/TRAINING PROGRAM

## 2025-06-27 PROCEDURE — 81025 URINE PREGNANCY TEST: CPT | Performed by: STUDENT IN AN ORGANIZED HEALTH CARE EDUCATION/TRAINING PROGRAM

## 2025-06-27 PROCEDURE — 84702 CHORIONIC GONADOTROPIN TEST: CPT | Performed by: STUDENT IN AN ORGANIZED HEALTH CARE EDUCATION/TRAINING PROGRAM

## 2025-06-27 PROCEDURE — 83880 ASSAY OF NATRIURETIC PEPTIDE: CPT | Performed by: STUDENT IN AN ORGANIZED HEALTH CARE EDUCATION/TRAINING PROGRAM

## 2025-06-27 PROCEDURE — 93010 ELECTROCARDIOGRAM REPORT: CPT | Mod: ,,, | Performed by: INTERNAL MEDICINE

## 2025-06-27 PROCEDURE — 93005 ELECTROCARDIOGRAM TRACING: CPT | Performed by: INTERNAL MEDICINE

## 2025-06-27 PROCEDURE — 80053 COMPREHEN METABOLIC PANEL: CPT | Performed by: STUDENT IN AN ORGANIZED HEALTH CARE EDUCATION/TRAINING PROGRAM

## 2025-06-27 NOTE — LETTER
Patient: Janice Hartmann  YOB: 1985  Date: 6/28/2025 Time: 4:04 AM  Location: [facility]    Leaving the Hospital Against Medical Advice    Chart #:[id number]    This will certify that I, the undersigned,    ______________________________________________________________________    A patient in the above named medical center, having requested discharge and removal from the medical center against the advice of my attending physician(s), hereby release [facility], its physicians, officers and employees, severally and individually, from any and all liability of any nature whatsoever for any injury or harm or complication of any kind that may result directly or indirectly, by reason of my terminating my stay as a patient at [facility] and my departure from [facility], and hereby waive any and all rights of action I may now have or later acquire as a result of my voluntary departure from [facility] and the termination of my stay as a patient therein.    This release is made with the full knowledge of the danger that may result from the action which I am taking.      Date:_______________________                         ___________________________                                                                                    Patient/Legal Representative    Witness:        ____________________________                          ___________________________  Nurse                                                                        Physician

## 2025-06-27 NOTE — Clinical Note
Diagnosis: Shortness of breath [786.05.ICD-9-CM]   Future Attending Provider: ADITYA MADSEN [[id number]]   Place in Observation: [facility] [[id number]]

## 2025-06-28 VITALS
SYSTOLIC BLOOD PRESSURE: 111 MMHG | WEIGHT: 143 LBS | OXYGEN SATURATION: 99 % | RESPIRATION RATE: 21 BRPM | HEIGHT: 64 IN | BODY MASS INDEX: 24.41 KG/M2 | DIASTOLIC BLOOD PRESSURE: 73 MMHG | TEMPERATURE: 98 F | HEART RATE: 69 BPM

## 2025-06-28 PROBLEM — I50.9 HEART FAILURE: Status: ACTIVE | Noted: 2025-06-28

## 2025-06-28 PROBLEM — R07.9 CHEST PAIN: Status: ACTIVE | Noted: 2025-06-28

## 2025-06-28 LAB
B-HCG FREE SERPL-ACNC: <0.6 MIU/ML
OHS QRS DURATION: 78 MS
OHS QTC CALCULATION: 409 MS
TROPONIN HIGH SENSITIVE (SMH): <2.3 PG/ML

## 2025-06-28 PROCEDURE — G0378 HOSPITAL OBSERVATION PER HR: HCPCS

## 2025-06-28 RX ORDER — SODIUM CHLORIDE 0.9 % (FLUSH) 0.9 %
10 SYRINGE (ML) INJECTION EVERY 12 HOURS PRN
Status: DISCONTINUED | OUTPATIENT
Start: 2025-06-28 | End: 2025-06-28 | Stop reason: HOSPADM

## 2025-06-28 RX ORDER — NAPROXEN SODIUM 220 MG/1
81 TABLET, FILM COATED ORAL DAILY
Status: DISCONTINUED | OUTPATIENT
Start: 2025-06-28 | End: 2025-06-28 | Stop reason: HOSPADM

## 2025-06-28 RX ORDER — LANOLIN ALCOHOL/MO/W.PET/CERES
800 CREAM (GRAM) TOPICAL
Status: DISCONTINUED | OUTPATIENT
Start: 2025-06-28 | End: 2025-06-28 | Stop reason: HOSPADM

## 2025-06-28 RX ORDER — ONDANSETRON HYDROCHLORIDE 2 MG/ML
4 INJECTION, SOLUTION INTRAVENOUS EVERY 8 HOURS PRN
Status: DISCONTINUED | OUTPATIENT
Start: 2025-06-28 | End: 2025-06-28 | Stop reason: HOSPADM

## 2025-06-28 RX ORDER — IBUPROFEN 200 MG
16 TABLET ORAL
Status: DISCONTINUED | OUTPATIENT
Start: 2025-06-28 | End: 2025-06-28 | Stop reason: HOSPADM

## 2025-06-28 RX ORDER — GLUCAGON 1 MG
1 KIT INJECTION
Status: DISCONTINUED | OUTPATIENT
Start: 2025-06-28 | End: 2025-06-28 | Stop reason: HOSPADM

## 2025-06-28 RX ORDER — NALOXONE HCL 0.4 MG/ML
0.02 VIAL (ML) INJECTION
Status: DISCONTINUED | OUTPATIENT
Start: 2025-06-28 | End: 2025-06-28 | Stop reason: HOSPADM

## 2025-06-28 RX ORDER — ACETAMINOPHEN 325 MG/1
650 TABLET ORAL EVERY 4 HOURS PRN
Status: DISCONTINUED | OUTPATIENT
Start: 2025-06-28 | End: 2025-06-28 | Stop reason: HOSPADM

## 2025-06-28 RX ORDER — IBUPROFEN 200 MG
24 TABLET ORAL
Status: DISCONTINUED | OUTPATIENT
Start: 2025-06-28 | End: 2025-06-28 | Stop reason: HOSPADM

## 2025-06-28 RX ORDER — ASPIRIN 325 MG
325 TABLET ORAL
Status: DISCONTINUED | OUTPATIENT
Start: 2025-06-28 | End: 2025-06-28 | Stop reason: HOSPADM

## 2025-06-28 RX ORDER — AMOXICILLIN 250 MG
1 CAPSULE ORAL 2 TIMES DAILY
Status: DISCONTINUED | OUTPATIENT
Start: 2025-06-28 | End: 2025-06-28 | Stop reason: HOSPADM

## 2025-06-28 NOTE — PHARMACY MED REC
"          Admission Medication History     The home medication history was taken by Angle Olson.    You may go to "Admission" then "Reconcile Home Medications" tabs to review and/or act upon these items.     The home medication list has been updated by the Pharmacy department.   Please read ALL comments highlighted in yellow.   Please address this information as you see fit.    Feel free to contact us if you have any questions or require assistance.        Medications listed below were obtained from: Patient/family and Analytic software- [employer]  Medications Ordered Prior to Encounter[1]    Potential issues to be addressed PRIOR TO DISCHARGE  Patient reported not taking the following medications: (Folic Acid, B12). These medications remain on the home medication list. Please address accordingly.     Angle Olson  [id number]        .               [1]   No current facility-administered medications on file prior to encounter.     Current Outpatient Medications on File Prior to Encounter   Medication Sig Dispense Refill    ergocalciferol (ERGOCALCIFEROL) 50,000 unit Cap Take 1 capsule (50,000 Units total) by mouth twice a week. 26 capsule 1    pregabalin (LYRICA) 200 MG Cap Take 1 capsule (200 mg total) by mouth 3 (three) times daily. 270 capsule 0    cyanocobalamin, vitamin B-12, 2,000 mcg Tab Take 1 tablet by mouth once daily. (Patient not taking: Reported on 6/27/2025) 90 tablet 1    folic acid (FOLVITE) 1 MG tablet Take 1 tablet (1,000 mcg total) by mouth once daily. (Patient not taking: Reported on 6/27/2025) 90 tablet 1    [DISCONTINUED] ondansetron (ZOFRAN-ODT) 4 MG TbDL Take 1 tablet (4 mg total) by mouth every 8 (eight) hours as needed (nausea). (Patient not taking: Reported on 6/20/2025) 30 tablet 0    [DISCONTINUED] scopolamine (TRANSDERM-SCOP) 1.3-1.5 mg (1 mg over 3 days) Place 1 patch onto the skin every 72 hours. (Patient not taking: Reported on 6/20/2025) 4 patch 0     "

## 2025-06-28 NOTE — ED PROVIDER NOTES
Encounter Date: 2025       History     Chief Complaint   Patient presents with    Chest Pain     On/off x1 week.     Leg Swelling     X3 weeks    Shortness of Breath     On/off x1 week worse with swelling in BLE     HPI  40-year-old woman presents for evaluation of chest pain right-sided described as pressure and sharp she said it is always there but sometimes it gets worse no obvious exacerbating or relieving factors.  She has bilateral leg swelling for 3 weeks.  She says she has been short of breath primarily when she lays flat for a week.  She denies fever chills cough congestion belly pain nausea or vomiting change in bowel or bladder habits.  She said she took a Lasix at home and this made her feel better.    She has a family history of early cardiac death  Review of patient's allergies indicates:  No Known Allergies  Past Medical History:   Diagnosis Date    Anxiety and depression 2019    Fibromyalgia 2012    Lupus     states per [facility]    r/o Heart failure 2025    Severe preeclampsia 2015     Past Surgical History:   Procedure Laterality Date    APPENDECTOMY       SECTION      x 2    CHOLECYSTECTOMY  2021    SALPINGECTOMY      Lx in  for ectopic pregnancy    SLEEVE GASTROPLASTY      2019, starting weight 250     Family History   Problem Relation Name Age of Onset    Breast cancer Mother      Hypertension Mother      Rheum arthritis Mother      Thyroid disease Mother      Fibromyalgia Mother      Alcohol abuse Father      Cancer Father      Diabetes Mellitus Father      Hypertension Father      Cancer Maternal Aunt          breast    Breast cancer Maternal Aunt      Cancer Maternal Grandmother          breast    Breast cancer Maternal Grandmother      Cancer Maternal Grandfather      Breast cancer Paternal Grandmother       Social History[1]  Review of Systems   All other systems reviewed and are negative.      Physical Exam     Initial Vitals [25 2143]   BP  Pulse Resp Temp SpO2   138/83 74 18 98.2 °F (36.8 °C) 100 %      MAP       --         Physical Exam    Nursing note and vitals reviewed.  Constitutional: She appears well-developed. She is not diaphoretic.   HENT:   Head: Normocephalic and atraumatic.   Eyes: Right eye exhibits no discharge. Left eye exhibits no discharge.   Neck: No tracheal deviation present.   Cardiovascular:  Normal rate, regular rhythm and intact distal pulses.           Pulmonary/Chest: Breath sounds normal. No stridor. No respiratory distress. She has no wheezes. She has no rales.   Abdominal: Abdomen is soft. There is no abdominal tenderness. There is no rebound and no guarding.   Musculoskeletal:         General: Edema present.      Comments: Bilateral pedal edema     Neurological: She is alert and oriented to person, place, and time.   Skin: Skin is warm and dry.         ED Course   Procedures  Labs Reviewed   COMPREHENSIVE METABOLIC PANEL - Abnormal       Result Value    Sodium 139      Potassium 4.1      Chloride 110      CO2 24      Glucose 90      BUN 18      Creatinine 1.1      Calcium 9.0      Protein Total 6.6      Albumin 4.1      Bilirubin Total 0.4      ALP 39 (*)     AST 14      ALT 9 (*)     Anion Gap 5 (*)     eGFR >60     CBC WITH DIFFERENTIAL - Abnormal    WBC 6.56      RBC 4.18      Hgb 13.3      Hct 39.9      MCV 96      MCH 31.8 (*)     MCHC 33.3      RDW 12.4      Platelet Count 194      MPV 11.9      Nucleated RBC 0      Neut % 49.4      Lymph % 42.2      Mono % 6.3      Eos % 0.9      Basophil % 0.9      Imm Grans % 0.3      Neut # 3.2      Lymph # 2.77      Mono # 0.41      Eos # 0.06      Baso # 0.06      Imm Grans # 0.02     MAGNESIUM - Normal    Magnesium 2.0     TROPONIN I HIGH SENSITIVITY - Normal    Troponin High Sensitive <2.3     B-TYPE NATRIURETIC PEPTIDE - Normal    BNP 17     PROTIME-INR - Normal    PT 10.6      INR 0.9     TROPONIN I HIGH SENSITIVITY - Normal    Troponin High Sensitive <2.3     CBC W/ AUTO  DIFFERENTIAL    Narrative:     The following orders were created for panel order CBC auto differential.  Procedure                               Abnormality         Status                     ---------                               -----------         ------                     CBC with Differential[[id number]]       Abnormal            Final result                 Please view results for these tests on the individual orders.   HCG, QUANTITATIVE    Beta HCG Quant <0.60     EXTRA TUBES    Narrative:     The following orders were created for panel order EXTRA TUBES.  Procedure                               Abnormality         Status                     ---------                               -----------         ------                     Light Green Top Hold[[id number]]                            In process                   Please view results for these tests on the individual orders.   LIGHT GREEN TOP HOLD   TSH   TROPONIN I HIGH SENSITIVITY   HEMOGLOBIN A1C   LIPID PANEL   POCT URINE PREGNANCY    POC Preg Test, Ur Negative       Acceptable Yes            Imaging Results              X-Ray Chest AP Portable (Final result)  Result time 06/27/25 23:21:01      Final result by Kenneth Draper MD (06/27/25 23:21:01)                   Impression:      No evidence of acute cardiopulmonary process.      Electronically signed by: Kenneth Draper  Date:    06/27/2025  Time:    23:21               Narrative:    EXAMINATION:  XR CHEST AP PORTABLE    CLINICAL HISTORY:  Chest Pain;    TECHNIQUE:  Single frontal view of the chest was performed.    COMPARISON:  None available.    FINDINGS:  Heart size and pulmonary vasculature are within normal limits.  No evidence of focal consolidation, pneumothorax, or pleural effusion.  No evidence of acute osseous process.                                       US Lower Extremity Veins Bilateral (Final result)  Result time 06/27/25 22:53:10      Final result by Bonny  Kenneth SOLARES MD (06/27/25 22:53:10)                   Impression:      No evidence of deep venous thrombosis in either lower extremity.      Electronically signed by: Kenneth Draper  Date:    06/27/2025  Time:    22:53               Narrative:    EXAMINATION:  US LOWER EXTREMITY VEINS BILATERAL    CLINICAL HISTORY:  Other specified soft tissue disorders    TECHNIQUE:  Duplex and color flow Doppler and dynamic compression was performed of the bilateral lower extremity veins was performed.    COMPARISON:  None    FINDINGS:  Right thigh veins: The common femoral, femoral, popliteal, upper greater saphenous, and deep femoral veins are patent and free of thrombus. The veins are normally compressible and have normal phasic flow and augmentation response.    Right calf veins: The visualized calf veins are patent.    Left thigh veins: The common femoral, femoral, popliteal, upper greater saphenous, and deep femoral veins are patent and free of thrombus. The veins are normally compressible and have normal phasic flow and augmentation response.    Left calf veins: The visualized calf veins are patent.    Miscellaneous: None                                       Medications   pregabalin capsule 100 mg (has no administration in time range)   sodium chloride 0.9% flush 10 mL (has no administration in time range)   naloxone 0.4 mg/mL injection 0.02 mg (has no administration in time range)   glucose chewable tablet 16 g (has no administration in time range)   glucose chewable tablet 24 g (has no administration in time range)   dextrose 50% injection 12.5 g (has no administration in time range)   dextrose 50% injection 25 g (has no administration in time range)   glucagon (human recombinant) injection 1 mg (has no administration in time range)   potassium bicarbonate disintegrating tablet 50 mEq (has no administration in time range)   magnesium oxide tablet 800 mg (has no administration in time range)   acetaminophen tablet 650 mg  (has no administration in time range)   senna-docusate 8.6-50 mg per tablet 1 tablet (has no administration in time range)   ondansetron injection 4 mg (has no administration in time range)   aspirin tablet 325 mg (has no administration in time range)     Medical Decision Making  40-year-old with chest pain and orthopnea presents with vital signs stable, bilateral pedal edema, she is comfortable appearing.  Differential includes heart failure, dependent edema, metabolic or endocrine derangement, ACS.  She had a negative CT PE within the last week.  She had a negative DVT study.  We had a shared decision-making discussion I will not get a repeat CT PE we will rescan her legs looking for a DVT.  Discussed that even if her workup looks okay with her orthopnea and leg swelling it would be reasonable to admit her for an echocardiogram    She would like to be admitted for an echocardiogram.  I discussed with hospital medicine.  I have given her an aspirin.  Repeat exam is reassuring    Amount and/or Complexity of Data Reviewed  Labs: ordered.  Radiology: ordered and independent interpretation performed. Decision-making details documented in ED Course.  ECG/medicine tests: ordered and independent interpretation performed. Decision-making details documented in ED Course.    Risk  OTC drugs.               ED Course as of 06/28/25 0143   Fri Jun 27, 2025   2255 DVT ultrasound on my independent interpretation with vascular flow present [IC]   2310 Chest x-ray on my independent interpretation without focal consolidation or pneumothorax [IC]      ED Course User Index  [IC] Michael Mark MD                           Clinical Impression:  Final diagnoses:  [R07.9] Chest pain  [M79.89] Leg swelling  [R06.02] Shortness of breath (Primary)          ED Disposition Condition    Observation                         [1]   Social History  Tobacco Use    Smoking status: Never    Smokeless tobacco: Never   Substance Use Topics     Alcohol use: No    Drug use: No        JasMichael MD  06/28/25 0143

## 2025-06-28 NOTE — HPI
40-year-old woman presents for evaluation of chest pain  and leg swelling .  It was right-sided described as pressure and sharp and no obvious exacerbating or relieving factors.  She has bilateral leg swelling for 3 weeks but it was comes and goes especially standing and resolved lying down ( saw the foot picture took earlier today and very swollen and now near normal )  Also work up was done with PCP for SOB. Never smoker . US leg negative OP and repeat here neg. D dimer was mildly elevated and CTA done as OP and negative for PE and negative CT chest .  She had previous gastric bypass surgery and hysterectomy and gall bladder and appendix removed.   She denies fever chills cough , abdominal pain , nausea or vomiting change in bowel or bladder habits and no recent viral symptoms    She said she took a Lasix at home and this made her feel better asking to prescribe . CXR repeat neg

## 2025-06-28 NOTE — ASSESSMENT & PLAN NOTE
"  Problem: Adult Inpatient Plan of Care  Goal: Plan of Care Review  Description: The Plan of Care Review/Shift note should be completed every shift.  The Outcome Evaluation is a brief statement about your assessment that the patient is improving, declining, or no change.  This information will be displayed automatically on your shift  note.  Outcome: Progressing     Problem: Anxiety  Goal: Anxiety Reduction or Resolution  Outcome: Progressing     Problem: Psychotic Signs/Symptoms  Goal: Improved Behavioral Control (Psychotic Signs/Symptoms)  Outcome: Progressing   Goal Outcome Evaluation:    Plan of Care Reviewed With: patient      Pt stated am very anxious I can't stand it, scheduled Zyprexa was given and later patient stated it wasn't effective. Pt stated \"I have tried some coping mechanism but nothing is working\". Scheduled ClonazePam was given earlier per pt request and pt verbalized it was effective. Pt is alert and able to verbalize needs known, visible in the milieu, paced the halls with headphones on. Endorsed anxiety/depression high, A/Hallucination with no harm, denied SI, and all other mental health issues. Pt was able to contract for safety this shift, flat affect but brightens up upon approach.   Pt continues SIO 1:1 5 ft, compliant with medication, hygiene is appropriate,  requested for Nicotine patch stated it works better but multiple requests for Nicotine gum. Pt is medication compliant, no any outburst behavior noted.    /89   Pulse 96   Temp 97.7  F   Resp 16   SpO2 93%                   " Unclear source of leg edema   BNP is normal . Not obese . CTA neg for PE , possible pul HTN?  ECHO and stress test tomorrow   IV lasix if needed after ECHO   Venous insufficiency is another differential . Albumin is not low and no renal insuffiencey

## 2025-06-28 NOTE — SUBJECTIVE & OBJECTIVE
Past Medical History:   Diagnosis Date    Anxiety and depression 2019    Fibromyalgia 2012    Lupus     states per [facility]    r/o Heart failure 2025    Severe preeclampsia 2015       Past Surgical History:   Procedure Laterality Date    APPENDECTOMY       SECTION      x 2    CHOLECYSTECTOMY  2021    SALPINGECTOMY      Lx in  for ectopic pregnancy    SLEEVE GASTROPLASTY      2019, starting weight 250       Review of patient's allergies indicates:  No Known Allergies    No current facility-administered medications on file prior to encounter.     Current Outpatient Medications on File Prior to Encounter   Medication Sig    ergocalciferol (ERGOCALCIFEROL) 50,000 unit Cap Take 1 capsule (50,000 Units total) by mouth twice a week.    pregabalin (LYRICA) 200 MG Cap Take 1 capsule (200 mg total) by mouth 3 (three) times daily.    cyanocobalamin, vitamin B-12, 2,000 mcg Tab Take 1 tablet by mouth once daily. (Patient not taking: Reported on 2025)    folic acid (FOLVITE) 1 MG tablet Take 1 tablet (1,000 mcg total) by mouth once daily. (Patient not taking: Reported on 2025)     Family History       Problem Relation (Age of Onset)    Alcohol abuse Father    Breast cancer Mother, Maternal Aunt, Maternal Grandmother, Paternal Grandmother    Cancer Father, Maternal Aunt, Maternal Grandmother, Maternal Grandfather    Diabetes Mellitus Father    Fibromyalgia Mother    Hypertension Mother, Father    Rheum arthritis Mother    Thyroid disease Mother          Tobacco Use    Smoking status: Never    Smokeless tobacco: Never   Substance and Sexual Activity    Alcohol use: No    Drug use: No    Sexual activity: Yes     Partners: Male     Review of Systems   Constitutional:  Negative for activity change and fever.   Respiratory:  Negative for shortness of breath and wheezing.    Cardiovascular:  Positive for chest pain. Negative for leg swelling.   Gastrointestinal:  Negative for abdominal  distention and abdominal pain.   Genitourinary:  Negative for difficulty urinating.   Skin:  Negative for color change.   Neurological:  Negative for dizziness and facial asymmetry.   Psychiatric/Behavioral:  Negative for agitation and confusion.      Objective:     Vital Signs (Most Recent):  Temp: 98.2 °F (36.8 °C) (06/27/25 2143)  Pulse: 80 (06/28/25 0115)  Resp: (!) 21 (06/28/25 0115)  BP: (!) 107/59 (06/28/25 0100)  SpO2: 100 % (06/28/25 0115) Vital Signs (24h Range):  Temp:  [98.2 °F (36.8 °C)] 98.2 °F (36.8 °C)  Pulse:  [69-80] 80  Resp:  [18-23] 21  SpO2:  [100 %] 100 %  BP: (103-139)/(59-83) 107/59     Weight: 64.9 kg (143 lb)  Body mass index is 24.55 kg/m².     Physical Exam  Vitals and nursing note reviewed.   HENT:      Head: Normocephalic and atraumatic.   Eyes:      Conjunctiva/sclera: Conjunctivae normal.   Neck:      Vascular: No JVD.   Cardiovascular:      Rate and Rhythm: Normal rate.      Heart sounds: Normal heart sounds.   Pulmonary:      Effort: Pulmonary effort is normal.      Breath sounds: Normal breath sounds.   Abdominal:      General: Abdomen is flat.      Palpations: Abdomen is soft.   Musculoskeletal:         General: Normal range of motion.   Skin:     General: Skin is warm.   Neurological:      Mental Status: She is alert and oriented to person, place, and time.   Psychiatric:         Mood and Affect: Mood normal.                Significant Labs: All pertinent labs within the past 24 hours have been reviewed.  CBC:   Recent Labs   Lab 06/27/25 2210   WBC 6.56   HGB 13.3   HCT 39.9        CMP:   Recent Labs   Lab 06/27/25 2210      K 4.1      CO2 24   GLU 90   BUN 18   CREATININE 1.1   CALCIUM 9.0   PROT 6.6   ALBUMIN 4.1   BILITOT 0.4   ALKPHOS 39*   AST 14   ALT 9*   ANIONGAP 5*     Cardiac Markers:   Recent Labs   Lab 06/27/25 2210   BNP 17       Significant Imaging: I have reviewed all pertinent imaging results/findings within the past 24 hours.

## 2025-06-28 NOTE — ASSESSMENT & PLAN NOTE
R/o pul HTN   ECHO   Patient is not obese anymore after bypass surgery and may need to re eval for CPAP use

## 2025-06-28 NOTE — H&P
[facility] - Emergency Dept  Hospital Medicine  History & Physical    Patient Name: Janice Hartmann  MRN: [id number]  Patient Class: OP- Observation  Admission Date: 2025  Attending Physician: Vitaly Finch MD   Primary Care Provider: Celine Obrien APRN,FNP-C         Patient information was obtained from patient and ER records.     Subjective:     Principal Problem:<principal problem not specified>    Chief Complaint:   Chief Complaint   Patient presents with    Chest Pain     On/off x1 week.     Leg Swelling     X3 weeks    Shortness of Breath     On/off x1 week worse with swelling in BLE        HPI: 40-year-old woman presents for evaluation of chest pain  and leg swelling .  It was right-sided described as pressure and sharp and no obvious exacerbating or relieving factors.  She has bilateral leg swelling for 3 weeks but it was comes and goes especially standing and resolved lying down ( saw the foot picture took earlier today and very swollen and now near normal )  Also work up was done with PCP for SOB. Never smoker . US leg negative OP and repeat here neg. D dimer was mildly elevated and CTA done as OP and negative for PE and negative CT chest .  She had previous gastric bypass surgery and hysterectomy and gall bladder and appendix removed.   She denies fever chills cough , abdominal pain , nausea or vomiting change in bowel or bladder habits and no recent viral symptoms    She said she took a Lasix at home and this made her feel better asking to prescribe . CXR repeat neg         Past Medical History:   Diagnosis Date    Anxiety and depression 2019    Fibromyalgia 2012    Lupus     states per [facility]    r/o Heart failure 2025    Severe preeclampsia 2015       Past Surgical History:   Procedure Laterality Date    APPENDECTOMY       SECTION      x 2    CHOLECYSTECTOMY  2021    SALPINGECTOMY      Lx in  for ectopic pregnancy    SLEEVE GASTROPLASTY       2019, starting weight 250       Review of patient's allergies indicates:  No Known Allergies    No current facility-administered medications on file prior to encounter.     Current Outpatient Medications on File Prior to Encounter   Medication Sig    ergocalciferol (ERGOCALCIFEROL) 50,000 unit Cap Take 1 capsule (50,000 Units total) by mouth twice a week.    pregabalin (LYRICA) 200 MG Cap Take 1 capsule (200 mg total) by mouth 3 (three) times daily.    cyanocobalamin, vitamin B-12, 2,000 mcg Tab Take 1 tablet by mouth once daily. (Patient not taking: Reported on 6/27/2025)    folic acid (FOLVITE) 1 MG tablet Take 1 tablet (1,000 mcg total) by mouth once daily. (Patient not taking: Reported on 6/27/2025)     Family History       Problem Relation (Age of Onset)    Alcohol abuse Father    Breast cancer Mother, Maternal Aunt, Maternal Grandmother, Paternal Grandmother    Cancer Father, Maternal Aunt, Maternal Grandmother, Maternal Grandfather    Diabetes Mellitus Father    Fibromyalgia Mother    Hypertension Mother, Father    Rheum arthritis Mother    Thyroid disease Mother          Tobacco Use    Smoking status: Never    Smokeless tobacco: Never   Substance and Sexual Activity    Alcohol use: No    Drug use: No    Sexual activity: Yes     Partners: Male     Review of Systems   Constitutional:  Negative for activity change and fever.   Respiratory:  Negative for shortness of breath and wheezing.    Cardiovascular:  Positive for chest pain. Negative for leg swelling.   Gastrointestinal:  Negative for abdominal distention and abdominal pain.   Genitourinary:  Negative for difficulty urinating.   Skin:  Negative for color change.   Neurological:  Negative for dizziness and facial asymmetry.   Psychiatric/Behavioral:  Negative for agitation and confusion.      Objective:     Vital Signs (Most Recent):  Temp: 98.2 °F (36.8 °C) (06/27/25 2143)  Pulse: 80 (06/28/25 0115)  Resp: (!) 21 (06/28/25 0115)  BP: (!) 107/59  (06/28/25 0100)  SpO2: 100 % (06/28/25 0115) Vital Signs (24h Range):  Temp:  [98.2 °F (36.8 °C)] 98.2 °F (36.8 °C)  Pulse:  [69-80] 80  Resp:  [18-23] 21  SpO2:  [100 %] 100 %  BP: (103-139)/(59-83) 107/59     Weight: 64.9 kg (143 lb)  Body mass index is 24.55 kg/m².     Physical Exam  Vitals and nursing note reviewed.   HENT:      Head: Normocephalic and atraumatic.   Eyes:      Conjunctiva/sclera: Conjunctivae normal.   Neck:      Vascular: No JVD.   Cardiovascular:      Rate and Rhythm: Normal rate.      Heart sounds: Normal heart sounds.   Pulmonary:      Effort: Pulmonary effort is normal.      Breath sounds: Normal breath sounds.   Abdominal:      General: Abdomen is flat.      Palpations: Abdomen is soft.   Musculoskeletal:         General: Normal range of motion.   Skin:     General: Skin is warm.   Neurological:      Mental Status: She is alert and oriented to person, place, and time.   Psychiatric:         Mood and Affect: Mood normal.                Significant Labs: All pertinent labs within the past 24 hours have been reviewed.  CBC:   Recent Labs   Lab 06/27/25  2210   WBC 6.56   HGB 13.3   HCT 39.9        CMP:   Recent Labs   Lab 06/27/25  2210      K 4.1      CO2 24   GLU 90   BUN 18   CREATININE 1.1   CALCIUM 9.0   PROT 6.6   ALBUMIN 4.1   BILITOT 0.4   ALKPHOS 39*   AST 14   ALT 9*   ANIONGAP 5*     Cardiac Markers:   Recent Labs   Lab 06/27/25  2210   BNP 17       Significant Imaging: I have reviewed all pertinent imaging results/findings within the past 24 hours.  Assessment/Plan:     Assessment & Plan  Diet-controlled gestational diabetes mellitus  a1c    SONIA on CPAP  R/o pul HTN   ECHO   Patient is not obese anymore after bypass surgery and may need to re eval for CPAP use    Anxiety and depression  Aware  Home meds  S/P bariatric surgery  [facility]    Chest pain  Trop neg  Repeat one more   ECHO   Stress test in am   Aspirin  A1c and lipid panal       r/o Heart failure  Unclear  source of leg edema   BNP is normal . Not obese . CTA neg for PE , possible pul HTN?  ECHO and stress test tomorrow   IV lasix if needed after ECHO   Venous insufficiency is another differential . Albumin is not low and no renal insuffiencey     VTE Risk Mitigation (From admission, onward)           Ordered     IP VTE LOW RISK PATIENT  Once         06/28/25 0019     Place sequential compression device  Until discontinued         06/28/25 0019                                   On 06/28/2025, patient should be placed in hospital observation services under my care.             Vitaly Finch MD  Department of Hospital Medicine  [facility] - Emergency Dept

## 2025-07-01 ENCOUNTER — TELEPHONE (OUTPATIENT)
Dept: CARDIOLOGY | Facility: HOSPITAL | Age: 40
End: 2025-07-01

## 2025-07-01 ENCOUNTER — TELEPHONE (OUTPATIENT)
Facility: CLINIC | Age: 40
End: 2025-07-01
Payer: COMMERCIAL

## 2025-07-01 ENCOUNTER — OFFICE VISIT (OUTPATIENT)
Dept: CARDIOLOGY | Facility: CLINIC | Age: 40
End: 2025-07-01
Payer: COMMERCIAL

## 2025-07-01 VITALS
WEIGHT: 143 LBS | HEART RATE: 71 BPM | OXYGEN SATURATION: 99 % | DIASTOLIC BLOOD PRESSURE: 60 MMHG | SYSTOLIC BLOOD PRESSURE: 120 MMHG | HEIGHT: 64 IN | BODY MASS INDEX: 24.41 KG/M2

## 2025-07-01 DIAGNOSIS — R06.02 SOB (SHORTNESS OF BREATH): ICD-10-CM

## 2025-07-01 DIAGNOSIS — R60.0 PEDAL EDEMA: Primary | ICD-10-CM

## 2025-07-01 DIAGNOSIS — R07.9 CHEST PAIN, UNSPECIFIED TYPE: ICD-10-CM

## 2025-07-01 PROCEDURE — 99204 OFFICE O/P NEW MOD 45 MIN: CPT | Mod: S$GLB,,, | Performed by: INTERNAL MEDICINE

## 2025-07-01 PROCEDURE — 99999 PR PBB SHADOW E&M-EST. PATIENT-LVL IV: CPT | Mod: PBBFAC,,, | Performed by: INTERNAL MEDICINE

## 2025-07-01 RX ORDER — FUROSEMIDE 20 MG/1
20 TABLET ORAL DAILY PRN
Qty: 90 TABLET | Refills: 3 | Status: SHIPPED | OUTPATIENT
Start: 2025-07-01 | End: 2026-07-01

## 2025-07-01 NOTE — H&P (VIEW-ONLY)
"[facility] [place] [facility]    Subjective:     Patient ID:  Janice Hartmann is a 40 y.o. female patient here for evaluation Chest Pain (More of pressure feeling./Scheduled for stress & echo tomorrow.), Edema (In feet), and Shortness of Breath      History of Present Illness    CHIEF COMPLAINT:  Patient presents today with extreme swelling of feet and discoloration of toes for 4 weeks.    HISTORY OF PRESENT ILLNESS:  She reports 4 weeks of extreme bilateral foot and toe swelling, with LLE worse than right. She describes her feet and legs as normally being very skinny, and notes the current swelling is not typical for her. She experiences extremely painful discoloration of toes. Swelling partially improves with leg elevation for 3-4 hours, though not completely resolving to baseline. She denies temperature differences between legs. She experienced severe charley horses in April, describing them as extremely intense and requiring 30 minutes to resolve, significantly more painful and prolonged compared to standard muscle spasms.    CARDIOVASCULAR:  She presented to the ED over the weekend with severe chest pain while lying down, accompanied by dyspnea. She had two abnormal EKGs within 2 weeks, one in-office and one outpatient. BNP was normal and echo findings were normal.    LABS / TEST RESULTS:  D-dimer was positive. CT was performed. FRACISCO and venous studies showed no DVT.    RESPONSE TO LASIX:  She reports positive response to Lasix, noting significant reduction in fluid retention. When taking Lasix, her feet return to normal size and she feels "like herself again". The medication's effect takes 1 day to reduce fluid buildup.    MEDICAL HISTORY:  Fibromyalgia managed with low-dose Lyrica at night. History of positive lupus tests with two separate positive RITESH tests, though no active lupus symptoms reported. Obstetric history significant for  deliveries with one " child born at 24.5 weeks gestation and another at 34 weeks gestation.    FAMILY HISTORY:  Father  at age 55-56 secondary to cardiac arrest.    SOCIAL HISTORY:  She denies tobacco use and alcohol consumption. She maintains an active lifestyle and follows a healthy diet with low salt intake.      ROS:  ROS is negative unless otherwise indicated in HPI.           ROS     Past Medical History:   Diagnosis Date    Anxiety and depression 2019    Fibromyalgia 2012    Lupus     states per [place]    r/o Heart failure 2025    Severe preeclampsia 2015       Past Surgical History:   Procedure Laterality Date    APPENDECTOMY       SECTION      x 2    CHOLECYSTECTOMY  2021    SALPINGECTOMY      Lx in  for ectopic pregnancy    SLEEVE GASTROPLASTY      2019, starting weight 250       Family History   Problem Relation Name Age of Onset    Breast cancer Mother      Hypertension Mother      Rheum arthritis Mother      Thyroid disease Mother      Fibromyalgia Mother      Alcohol abuse Father      Cancer Father      Diabetes Mellitus Father      Hypertension Father      Cancer Maternal Aunt          breast    Breast cancer Maternal Aunt      Cancer Maternal Grandmother          breast    Breast cancer Maternal Grandmother      Cancer Maternal Grandfather      Breast cancer Paternal Grandmother         Social History     Socioeconomic History    Marital status:     Number of children: 2   Occupational History    Occupation: /mgr   Tobacco Use    Smoking status: Never    Smokeless tobacco: Never   Substance and Sexual Activity    Alcohol use: No    Drug use: No    Sexual activity: Yes     Partners: Male     Social Drivers of Health     Financial Resource Strain: Low Risk  (2025)    Overall Financial Resource Strain (CARDIA)     Difficulty of Paying Living Expenses: Not hard at all   Food Insecurity: No Food Insecurity (2025)    Hunger Vital Sign     Worried About  Running Out of Food in the Last Year: Never true     Ran Out of Food in the Last Year: Never true   Transportation Needs: No Transportation Needs (4/24/2025)    PRAPARE - Transportation     Lack of Transportation (Medical): No     Lack of Transportation (Non-Medical): No   Physical Activity: Insufficiently Active (4/24/2025)    Exercise Vital Sign     Days of Exercise per Week: 3 days     Minutes of Exercise per Session: 30 min   Stress: Stress Concern Present (4/24/2025)    [employer] [place] [employer] - Occupational Stress Questionnaire     Feeling of Stress : Rather much   Housing Stability: Low Risk  (4/24/2025)    Housing Stability Vital Sign     Unable to Pay for Housing in the Last Year: No     Number of Times Moved in the Last Year: 0     Homeless in the Last Year: No       Current Medications[1]    Review of patient's allergies indicates:  No Known Allergies      Objective:        Vitals:    07/01/25 1433   BP: 120/60   Pulse: 71       Physical Exam    LIPIDS - LAST 2   Lab Results   Component Value Date    CHOL 161 02/27/2025    CHOL 164 01/21/2023    HDL 60 02/27/2025    HDL 56 01/21/2023    LDLCALC 86 02/27/2025    LDLCALC 91 01/21/2023    TRIG 61 02/27/2025    TRIG 79 01/21/2023    CHOLHDL 2.7 02/27/2025    CHOLHDL 2.9 01/21/2023       CBC - LAST 2  Lab Results   Component Value Date    WBC 6.56 06/27/2025    WBC 6.30 06/20/2025    RBC 4.18 06/27/2025    RBC 4.33 06/20/2025    HGB 13.3 06/27/2025    HGB 13.7 06/20/2025    HCT 39.9 06/27/2025    HCT 41.2 06/20/2025    MCV 96 06/27/2025    MCV 95 06/20/2025    MCH 31.8 (H) 06/27/2025    MCH 31.6 (H) 06/20/2025    MCHC 33.3 06/27/2025    MCHC 33.3 06/20/2025    RDW 12.4 06/27/2025    RDW 12.5 06/20/2025     06/27/2025     06/20/2025    MPV 11.9 06/27/2025    MPV 11.6 06/20/2025    GRAN 58.8 02/15/2017    GRAN 4.8 02/15/2017    LYMPH 34.8 06/20/2025    LYMPH 2.19 06/20/2025    MONO 5.4 06/20/2025    MONO 0.34 06/20/2025    BASO 29  02/27/2025    BASO 41 01/21/2023    NRBC 0 06/27/2025    NRBC 0 06/20/2025       CHEMISTRY & LIVER FUNCTION - LAST 2  Lab Results   Component Value Date     06/27/2025     06/20/2025    K 4.1 06/27/2025    K 4.5 06/20/2025     06/27/2025     06/20/2025    CO2 24 06/27/2025    CO2 24 06/20/2025    ANIONGAP 5 (L) 06/27/2025    ANIONGAP 8 06/20/2025    BUN 18 06/27/2025    BUN 14 06/20/2025    CREATININE 1.1 06/27/2025    CREATININE 0.9 06/20/2025    GLU 90 06/27/2025    GLU 83 06/20/2025    CALCIUM 9.0 06/27/2025    CALCIUM 9.2 06/20/2025    MG 2.0 06/27/2025    MG 2.0 06/20/2025    ALBUMIN 4.1 06/27/2025    ALBUMIN 3.9 06/20/2025    PROT 6.6 06/27/2025    PROT 6.7 06/20/2025    ALKPHOS 39 (L) 06/27/2025    ALKPHOS 45 06/20/2025    ALT 9 (L) 06/27/2025    ALT 12 06/20/2025    AST 14 06/27/2025    AST 16 06/20/2025    BILITOT 0.4 06/27/2025    BILITOT 0.4 06/20/2025        CARDIAC PROFILE - LAST 2  Lab Results   Component Value Date    BNP 17 06/27/2025    BNP 58 06/20/2025     11/08/2012     12/15/2015        COAGULATION - LAST 2  Lab Results   Component Value Date    INR 0.9 06/27/2025       ENDOCRINE & PSA - LAST 2  Lab Results   Component Value Date    HGBA1C 4.5 01/21/2023    HGBA1C 4.6 12/01/2021    TSH 0.960 06/20/2025    TSH 1.09 02/27/2025        ECHOCARDIOGRAM RESULTS  No results found for this or any previous visit.      CURRENT/PREVIOUS VISIT EKG  Results for orders placed or performed during the hospital encounter of 06/27/25   EKG 12-lead    Collection Time: 06/27/25  9:38 PM   Result Value Ref Range    QRS Duration 78 ms    [facility] QTC Calculation 409 ms    Narrative    Test Reason : R07.9,    Vent. Rate :  73 BPM     Atrial Rate :  73 BPM     P-R Int : 130 ms          QRS Dur :  78 ms      QT Int : 372 ms       P-R-T Axes :  79  79  70 degrees    QTcB Int : 409 ms    Normal sinus rhythm  Normal ECG    Confirmed by Marlena Lira ([id number]) on 6/28/2025 2:11:19  PM    Referred By: RAF SELF           Confirmed By: Marlena Lira     No valid procedures specified.   No results found for this or any previous visit.    No valid procedures specified.        Assessment:        Assessment & Plan      40-year-old female here for evaluation of shortness of breath on lying down, occasional chest discomfort and swelling in the legs which improves with Lasix, BNP was negative, is scheduled for stress test and an echocardiogram tomorrow, negative Dopplers of the lower extremity.    PLAN SUMMARY:  - Ordered stress test and echocardiogram  - Ordered venous insufficiency study  - Referred to vascular surgeon for venous insufficiency evaluation  - Referred to dermatologist for skin color changes in feet  - Started Lasix 20 mg tablets as needed, not to exceed daily  - Use compression stockings, reduce salt intake, elevate legs when possible  - Follow up in 6 months, or sooner if symptoms worsen  - Contact office if symptoms worsen before scheduled follow-up    VENOUS INSUFFICIENCY:  - Considered venous stasis as potential cause of leg swelling and discoloration, given negative BNP and symptom improvement with elevation.  - Explained venous stasis and its mechanism involving damaged valves in leg veins leading to blood pooling.  - Use compression stockings, reduce salt intake, elevate legs when possible to improve swelling.  - Started Lasix 20 mg tablets, take as needed, not to exceed daily. Prescribed 30 pills for 90 days.  - Ordered venous insufficiency study.  - Referred to vascular surgeon for evaluation of venous insufficiency.    - Ordered stress test and echocardiogram to rule out cardiac causes.    EDEMA:  - Use compression stockings, reduce salt intake, elevate legs when possible to improve swelling.  - Started Lasix 20 mg tablets, take as needed, not to exceed daily.     AUTOIMMUNE DISORDER:  - Considered possibility of obscure autoimmune disorder given history of  positive RITESH and lupus tests.  Advised patient to discuss with the primary care regarding the same.    SKIN CHANGES:  - Referred to dermatologist for evaluation of skin color changes in feet.    FOLLOW-UP:  - Follow up in 6 months, or sooner if symptoms worsen.  - Contact the office if symptoms get worse before the scheduled follow-up.        1. Pedal edema    2. Chest pain, unspecified type    3. SOB (shortness of breath)           Plan:       Pedal edema  -     furosemide (LASIX) 20 MG tablet; Take 1 tablet (20 mg total) by mouth daily as needed (edema).  Dispense: 90 tablet; Refill: 3  -     US Lower Extremity Veins Bilateral Insufficiency; Future; Expected date: 07/01/2025  -     Ambulatory referral/consult to Vascular Surgery; Future; Expected date: 07/08/2025    Chest pain, unspecified type  -     Ambulatory referral/consult to Cardiology    SOB (shortness of breath)  -     Ambulatory referral/consult to Cardiology      Follow up in about 6 months (around 1/1/2026) for f/u pedal edema.        All pertinent data including labs, imaging, EKGs, and studies listed above were reviewed personally.  Patient's most recent EKG tracing was personally interpreted by this provider.    This note was generated with the assistance of ambient listening technology. Verbal consent was obtained by the patient and accompanying visitor(s) for the recording of patient appointment to facilitate this note. I attest to having reviewed and edited the generated note for accuracy, though some syntax or spelling errors may persist. Please contact the author of this note for any clarification.      MD [facility] [place] [facility] [place]         [1]   Current Outpatient Medications   Medication Sig Dispense Refill    ergocalciferol (ERGOCALCIFEROL) 50,000 unit Cap Take 1 capsule (50,000 Units total) by mouth twice a week. 26 capsule 1    pregabalin (LYRICA) 200 MG Cap Take 1 capsule (200  mg total) by mouth 3 (three) times daily. 270 capsule 0    cyanocobalamin, vitamin B-12, 2,000 mcg Tab Take 1 tablet by mouth once daily. (Patient not taking: Reported on 6/27/2025) 90 tablet 1    folic acid (FOLVITE) 1 MG tablet Take 1 tablet (1,000 mcg total) by mouth once daily. (Patient not taking: Reported on 7/1/2025) 90 tablet 1    furosemide (LASIX) 20 MG tablet Take 1 tablet (20 mg total) by mouth daily as needed (edema). 90 tablet 3     No current facility-administered medications for this visit.

## 2025-07-01 NOTE — TELEPHONE ENCOUNTER
Patient advised, test will be at [facility] ([address]  Will need to register on the first floor at the main entrance.  Patient advised that arrival time is 0700.  Patient advised that they may be here about 3.5-4 hours, and may want to bring something to occupy their time, as there will be periods of waiting.    Patient advised, they may take their medications prior to testing if you need to. Advised if food is needed to take medications, please keep it light, like toast and juice.    Patient advised to avoid all caffeine 12 hours prior to testing.  This includes chocolate, tea and decaf coffee.    Wear comfortable clothing.  No lotions, oils, or powders to the upper chest area. May wear deodorant.    No metal jewelry, buttons, or zippers to the upper body.  Patient verbalizes understanding of instructions.

## 2025-07-01 NOTE — PROGRESS NOTES
"[facility]    Subjective:     Patient ID:  Janice Hartmann is a 40 y.o. female patient here for evaluation Chest Pain (More of pressure feeling./Scheduled for stress & echo tomorrow.), Edema (In feet), and Shortness of Breath      History of Present Illness    CHIEF COMPLAINT:  Patient presents today with extreme swelling of feet and discoloration of toes for 4 weeks.    HISTORY OF PRESENT ILLNESS:  She reports 4 weeks of extreme bilateral foot and toe swelling, with LLE worse than right. She describes her feet and legs as normally being very skinny, and notes the current swelling is not typical for her. She experiences extremely painful discoloration of toes. Swelling partially improves with leg elevation for 3-4 hours, though not completely resolving to baseline. She denies temperature differences between legs. She experienced severe charley horses in April, describing them as extremely intense and requiring 30 minutes to resolve, significantly more painful and prolonged compared to standard muscle spasms.    CARDIOVASCULAR:  She presented to the ED over the weekend with severe chest pain while lying down, accompanied by dyspnea. She had two abnormal EKGs within 2 weeks, one in-office and one outpatient. BNP was normal and echo findings were normal.    LABS / TEST RESULTS:  D-dimer was positive. CT was performed. FRACISCO and venous studies showed no DVT.    RESPONSE TO LASIX:  She reports positive response to Lasix, noting significant reduction in fluid retention. When taking Lasix, her feet return to normal size and she feels "like herself again". The medication's effect takes 1 day to reduce fluid buildup.    MEDICAL HISTORY:  Fibromyalgia managed with low-dose Lyrica at night. History of positive lupus tests with two separate positive RITESH tests, though no active lupus symptoms reported. Obstetric history significant for  deliveries with one " child born at 24.5 weeks gestation and another at 34 weeks gestation.    FAMILY HISTORY:  Father  at age 55-56 secondary to cardiac arrest.    SOCIAL HISTORY:  She denies tobacco use and alcohol consumption. She maintains an active lifestyle and follows a healthy diet with low salt intake.      ROS:  ROS is negative unless otherwise indicated in HPI.           ROS     Past Medical History:   Diagnosis Date    Anxiety and depression 2019    Fibromyalgia 2012    Lupus     states per [place]    r/o Heart failure 2025    Severe preeclampsia 2015       Past Surgical History:   Procedure Laterality Date    APPENDECTOMY       SECTION      x 2    CHOLECYSTECTOMY  2021    SALPINGECTOMY      Lx in  for ectopic pregnancy    SLEEVE GASTROPLASTY      2019, starting weight 250       Family History   Problem Relation Name Age of Onset    Breast cancer Mother      Hypertension Mother      Rheum arthritis Mother      Thyroid disease Mother      Fibromyalgia Mother      Alcohol abuse Father      Cancer Father      Diabetes Mellitus Father      Hypertension Father      Cancer Maternal Aunt          breast    Breast cancer Maternal Aunt      Cancer Maternal Grandmother          breast    Breast cancer Maternal Grandmother      Cancer Maternal Grandfather      Breast cancer Paternal Grandmother         Social History     Socioeconomic History    Marital status:     Number of children: 2   Occupational History    Occupation: /mgr   Tobacco Use    Smoking status: Never    Smokeless tobacco: Never   Substance and Sexual Activity    Alcohol use: No    Drug use: No    Sexual activity: Yes     Partners: Male     Social Drivers of Health     Financial Resource Strain: Low Risk  (2025)    Overall Financial Resource Strain (CARDIA)     Difficulty of Paying Living Expenses: Not hard at all   Food Insecurity: No Food Insecurity (2025)    Hunger Vital Sign     Worried About  Running Out of Food in the Last Year: Never true     Ran Out of Food in the Last Year: Never true   Transportation Needs: No Transportation Needs (4/24/2025)    PRAPARE - Transportation     Lack of Transportation (Medical): No     Lack of Transportation (Non-Medical): No   Physical Activity: Insufficiently Active (4/24/2025)    Exercise Vital Sign     Days of Exercise per Week: 3 days     Minutes of Exercise per Session: 30 min   Stress: Stress Concern Present (4/24/2025)    [employer] - Occupational Stress Questionnaire     Feeling of Stress : Rather much   Housing Stability: Low Risk  (4/24/2025)    Housing Stability Vital Sign     Unable to Pay for Housing in the Last Year: No     Number of Times Moved in the Last Year: 0     Homeless in the Last Year: No       Current Medications[1]    Review of patient's allergies indicates:  No Known Allergies      Objective:        Vitals:    07/01/25 1433   BP: 120/60   Pulse: 71       Physical Exam    LIPIDS - LAST 2   Lab Results   Component Value Date    CHOL 161 02/27/2025    CHOL 164 01/21/2023    HDL 60 02/27/2025    HDL 56 01/21/2023    LDLCALC 86 02/27/2025    LDLCALC 91 01/21/2023    TRIG 61 02/27/2025    TRIG 79 01/21/2023    CHOLHDL 2.7 02/27/2025    CHOLHDL 2.9 01/21/2023       CBC - LAST 2  Lab Results   Component Value Date    WBC 6.56 06/27/2025    WBC 6.30 06/20/2025    RBC 4.18 06/27/2025    RBC 4.33 06/20/2025    HGB 13.3 06/27/2025    HGB 13.7 06/20/2025    HCT 39.9 06/27/2025    HCT 41.2 06/20/2025    MCV 96 06/27/2025    MCV 95 06/20/2025    MCH 31.8 (H) 06/27/2025    MCH 31.6 (H) 06/20/2025    MCHC 33.3 06/27/2025    MCHC 33.3 06/20/2025    RDW 12.4 06/27/2025    RDW 12.5 06/20/2025     06/27/2025     06/20/2025    MPV 11.9 06/27/2025    MPV 11.6 06/20/2025    GRAN 58.8 02/15/2017    GRAN 4.8 02/15/2017    LYMPH 34.8 06/20/2025    LYMPH 2.19 06/20/2025    MONO 5.4 06/20/2025    MONO 0.34 06/20/2025    BASO 29  02/27/2025    BASO 41 01/21/2023    NRBC 0 06/27/2025    NRBC 0 06/20/2025       CHEMISTRY & LIVER FUNCTION - LAST 2  Lab Results   Component Value Date     06/27/2025     06/20/2025    K 4.1 06/27/2025    K 4.5 06/20/2025     06/27/2025     06/20/2025    CO2 24 06/27/2025    CO2 24 06/20/2025    ANIONGAP 5 (L) 06/27/2025    ANIONGAP 8 06/20/2025    BUN 18 06/27/2025    BUN 14 06/20/2025    CREATININE 1.1 06/27/2025    CREATININE 0.9 06/20/2025    GLU 90 06/27/2025    GLU 83 06/20/2025    CALCIUM 9.0 06/27/2025    CALCIUM 9.2 06/20/2025    MG 2.0 06/27/2025    MG 2.0 06/20/2025    ALBUMIN 4.1 06/27/2025    ALBUMIN 3.9 06/20/2025    PROT 6.6 06/27/2025    PROT 6.7 06/20/2025    ALKPHOS 39 (L) 06/27/2025    ALKPHOS 45 06/20/2025    ALT 9 (L) 06/27/2025    ALT 12 06/20/2025    AST 14 06/27/2025    AST 16 06/20/2025    BILITOT 0.4 06/27/2025    BILITOT 0.4 06/20/2025        CARDIAC PROFILE - LAST 2  Lab Results   Component Value Date    BNP 17 06/27/2025    BNP 58 06/20/2025     11/08/2012     12/15/2015        COAGULATION - LAST 2  Lab Results   Component Value Date    INR 0.9 06/27/2025       ENDOCRINE & PSA - LAST 2  Lab Results   Component Value Date    HGBA1C 4.5 01/21/2023    HGBA1C 4.6 12/01/2021    TSH 0.960 06/20/2025    TSH 1.09 02/27/2025        ECHOCARDIOGRAM RESULTS  No results found for this or any previous visit.      CURRENT/PREVIOUS VISIT EKG  Results for orders placed or performed during the hospital encounter of 06/27/25   EKG 12-lead    Collection Time: 06/27/25  9:38 PM   Result Value Ref Range    QRS Duration 78 ms    OHS QTC Calculation 409 ms    Narrative    Test Reason : R07.9,    Vent. Rate :  73 BPM     Atrial Rate :  73 BPM     P-R Int : 130 ms          QRS Dur :  78 ms      QT Int : 372 ms       P-R-T Axes :  79  79  70 degrees    QTcB Int : 409 ms    Normal sinus rhythm  Normal ECG    Confirmed by Marlena Lira ([id number]) on 6/28/2025 2:11:19  PM    Referred By: RAF SELF           Confirmed By: Marlena Lira     No valid procedures specified.   No results found for this or any previous visit.    No valid procedures specified.        Assessment:        Assessment & Plan      40-year-old female here for evaluation of shortness of breath on lying down, occasional chest discomfort and swelling in the legs which improves with Lasix, BNP was negative, is scheduled for stress test and an echocardiogram tomorrow, negative Dopplers of the lower extremity.    PLAN SUMMARY:  - Ordered stress test and echocardiogram  - Ordered venous insufficiency study  - Referred to vascular surgeon for venous insufficiency evaluation  - Referred to dermatologist for skin color changes in feet  - Started Lasix 20 mg tablets as needed, not to exceed daily  - Use compression stockings, reduce salt intake, elevate legs when possible  - Follow up in 6 months, or sooner if symptoms worsen  - Contact office if symptoms worsen before scheduled follow-up    VENOUS INSUFFICIENCY:  - Considered venous stasis as potential cause of leg swelling and discoloration, given negative BNP and symptom improvement with elevation.  - Explained venous stasis and its mechanism involving damaged valves in leg veins leading to blood pooling.  - Use compression stockings, reduce salt intake, elevate legs when possible to improve swelling.  - Started Lasix 20 mg tablets, take as needed, not to exceed daily. Prescribed 30 pills for 90 days.  - Ordered venous insufficiency study.  - Referred to vascular surgeon for evaluation of venous insufficiency.    - Ordered stress test and echocardiogram to rule out cardiac causes.    EDEMA:  - Use compression stockings, reduce salt intake, elevate legs when possible to improve swelling.  - Started Lasix 20 mg tablets, take as needed, not to exceed daily.     AUTOIMMUNE DISORDER:  - Considered possibility of obscure autoimmune disorder given history of  positive RITESH and lupus tests.  Advised patient to discuss with the primary care regarding the same.    SKIN CHANGES:  - Referred to dermatologist for evaluation of skin color changes in feet.    FOLLOW-UP:  - Follow up in 6 months, or sooner if symptoms worsen.  - Contact the office if symptoms get worse before the scheduled follow-up.        1. Pedal edema    2. Chest pain, unspecified type    3. SOB (shortness of breath)           Plan:       Pedal edema  -     furosemide (LASIX) 20 MG tablet; Take 1 tablet (20 mg total) by mouth daily as needed (edema).  Dispense: 90 tablet; Refill: 3  -     US Lower Extremity Veins Bilateral Insufficiency; Future; Expected date: 07/01/2025  -     Ambulatory referral/consult to Vascular Surgery; Future; Expected date: 07/08/2025    Chest pain, unspecified type  -     Ambulatory referral/consult to Cardiology    SOB (shortness of breath)  -     Ambulatory referral/consult to Cardiology      Follow up in about 6 months (around 1/1/2026) for f/u pedal edema.        All pertinent data including labs, imaging, EKGs, and studies listed above were reviewed personally.  Patient's most recent EKG tracing was personally interpreted by this provider.    This note was generated with the assistance of ambient listening technology. Verbal consent was obtained by the patient and accompanying visitor(s) for the recording of patient appointment to facilitate this note. I attest to having reviewed and edited the generated note for accuracy, though some syntax or spelling errors may persist. Please contact the author of this note for any clarification.      MD [facility]         [1]   Current Outpatient Medications   Medication Sig Dispense Refill    ergocalciferol (ERGOCALCIFEROL) 50,000 unit Cap Take 1 capsule (50,000 Units total) by mouth twice a week. 26 capsule 1    pregabalin (LYRICA) 200 MG Cap Take 1 capsule (200  mg total) by mouth 3 (three) times daily. 270 capsule 0    cyanocobalamin, vitamin B-12, 2,000 mcg Tab Take 1 tablet by mouth once daily. (Patient not taking: Reported on 6/27/2025) 90 tablet 1    folic acid (FOLVITE) 1 MG tablet Take 1 tablet (1,000 mcg total) by mouth once daily. (Patient not taking: Reported on 7/1/2025) 90 tablet 1    furosemide (LASIX) 20 MG tablet Take 1 tablet (20 mg total) by mouth daily as needed (edema). 90 tablet 3     No current facility-administered medications for this visit.

## 2025-07-01 NOTE — TELEPHONE ENCOUNTER
Called pt yesterday, 6/30 to see if she would be willing to schedule with one of our other Nurse Practitioners, ERNIE Damon, or ERNIE Mejia since her Primary Care Provider, Celine Obrien may not be able to get her in in the for several days. Patient declined the offer to schedule with one of the other FNP's in the office who sees pt's for hospital follow ups and stated she would prefer to see FNP, Celine Obrien only and that she has only seen her once.

## 2025-07-02 ENCOUNTER — HOSPITAL ENCOUNTER (OUTPATIENT)
Dept: RADIOLOGY | Facility: HOSPITAL | Age: 40
Discharge: HOME | End: 2025-07-02
Attending: NURSE PRACTITIONER
Payer: COMMERCIAL

## 2025-07-02 ENCOUNTER — HOSPITAL ENCOUNTER (OUTPATIENT)
Dept: CARDIOLOGY | Facility: HOSPITAL | Age: 40
Discharge: HOME | End: 2025-07-02
Attending: NURSE PRACTITIONER
Payer: COMMERCIAL

## 2025-07-02 ENCOUNTER — PATIENT MESSAGE (OUTPATIENT)
Dept: CARDIOLOGY | Facility: CLINIC | Age: 40
End: 2025-07-02

## 2025-07-02 VITALS — HEIGHT: 64 IN | BODY MASS INDEX: 24.41 KG/M2 | WEIGHT: 143 LBS

## 2025-07-02 DIAGNOSIS — R06.02 SOB (SHORTNESS OF BREATH): ICD-10-CM

## 2025-07-02 DIAGNOSIS — R07.9 CHEST PAIN, UNSPECIFIED TYPE: ICD-10-CM

## 2025-07-02 LAB
AORTIC ROOT ANNULUS: 3.3 CM
AORTIC VALVE CUSP SEPERATION: 1.8 CM
AV INDEX (PROSTH): 0.84
AV MEAN GRADIENT: 3 MMHG
AV PEAK GRADIENT: 6 MMHG
AV VALVE AREA BY VELOCITY RATIO: 2.4 CM²
AV VALVE AREA: 2.6 CM²
AV VELOCITY RATIO: 0.75
BSA FOR ECHO PROCEDURE: 1.71 M2
CV ECHO LV RWT: 0.28 CM
CV PHARM DOSE: 0.4 MG
CV STRESS BASE HR: 69 BPM
DIASTOLIC BLOOD PRESSURE: 66 MMHG
DOP CALC AO PEAK VEL: 1.2 M/S
DOP CALC AO VTI: 27.7 CM
DOP CALC LVOT AREA: 3.1 CM2
DOP CALC LVOT DIAMETER: 2 CM
DOP CALC LVOT PEAK VEL: 0.9 M/S
DOP CALC LVOT STROKE VOLUME: 73.2 CM3
DOP CALCLVOT PEAK VEL VTI: 23.3 CM
E WAVE DECELERATION TIME: 211 MSEC
E/A RATIO: 2.4
E/E' RATIO: 7 M/S
ECHO LV POSTERIOR WALL: 0.6 CM (ref 0.6–1.1)
EJECTION FRACTION- HIGH: 65 %
END DIASTOLIC INDEX-HIGH: 153 ML/M2
END DIASTOLIC INDEX-LOW: 93 ML/M2
END SYSTOLIC INDEX-HIGH: 71 ML/M2
END SYSTOLIC INDEX-LOW: 31 ML/M2
FRACTIONAL SHORTENING: 32.6 % (ref 28–44)
INTERVENTRICULAR SEPTUM: 0.8 CM (ref 0.6–1.1)
IVRT: 74 MSEC
LEFT ATRIUM SIZE: 2.5 CM
LEFT INTERNAL DIMENSION IN SYSTOLE: 2.9 CM (ref 2.1–4)
LEFT VENTRICLE DIASTOLIC VOLUME INDEX: 48.24 ML/M2
LEFT VENTRICLE DIASTOLIC VOLUME: 82 ML
LEFT VENTRICLE MASS INDEX: 52.1 G/M2
LEFT VENTRICLE SYSTOLIC VOLUME INDEX: 18.8 ML/M2
LEFT VENTRICLE SYSTOLIC VOLUME: 32 ML
LEFT VENTRICULAR INTERNAL DIMENSION IN DIASTOLE: 4.3 CM (ref 3.5–6)
LEFT VENTRICULAR MASS: 88.5 G
LV LATERAL E/E' RATIO: 6 M/S
LV SEPTAL E/E' RATIO: 7.2 M/S
LVED V (TEICH): 82.2 ML
LVES V (TEICH): 31.9 ML
LVOT MG: 2 MMHG
LVOT MV: 0.59 CM/S
MV PEAK A VEL: 0.45 M/S
MV PEAK E VEL: 1.08 M/S
MV STENOSIS PRESSURE HALF TIME: 71 MS
MV VALVE AREA P 1/2 METHOD: 3.1 CM2
NUC REST DIASTOLIC VOLUME INDEX: 56
NUC REST EJECTION FRACTION: 64
NUC REST SYSTOLIC VOLUME INDEX: 20
NUC STRESS DIASTOLIC VOLUME INDEX: 52
NUC STRESS EJECTION FRACTION: 75 %
NUC STRESS SYSTOLIC VOLUME INDEX: 13
OHS CV CPX 1 MINUTE RECOVERY HEART RATE: 64 BPM
OHS CV CPX 85 PERCENT MAX PREDICTED HEART RATE MALE: 152
OHS CV CPX MAX PREDICTED HEART RATE: 179
OHS CV CPX PATIENT IS FEMALE: 1
OHS CV CPX PATIENT IS MALE: 0
OHS CV CPX PEAK DIASTOLIC BLOOD PRESSURE: 69 MMHG
OHS CV CPX PEAK HEAR RATE: 64 BPM
OHS CV CPX PEAK RATE PRESSURE PRODUCT: 6848
OHS CV CPX PEAK SYSTOLIC BLOOD PRESSURE: 107 MMHG
OHS CV CPX PERCENT MAX PREDICTED HEART RATE ACHIEVED: 38
OHS CV CPX RATE PRESSURE PRODUCT PRESENTING: 7038
OHS CV INITIAL DOSE: 12.5 MCG/KG/MIN
OHS CV PEAK DOSE: 27.1 MCG/KG/MIN
OHS CV RV/LV RATIO: 0.56 CM
PISA TR MAX VEL: 2.5 M/S
PV MV: 0.58 M/S
PV PEAK GRADIENT: 3 MMHG
PV PEAK VELOCITY: 0.83 M/S
RA PRESSURE ESTIMATED: 3 MMHG
RETIRED EF AND QEF - SEE NOTES: 53 %
RIGHT VENTRICLE DIASTOLIC BASEL DIMENSION: 2.4 CM
RIGHT VENTRICULAR END-DIASTOLIC DIMENSION: 2.44 CM
RV TB RVSP: 6 MMHG
SYSTOLIC BLOOD PRESSURE: 102 MMHG
TDI LATERAL: 0.18 M/S
TDI SEPTAL: 0.15 M/S
TDI: 0.17 M/S
TR MAX PG: 24 MMHG
TV REST PULMONARY ARTERY PRESSURE: 28 MMHG
Z-SCORE OF LEFT VENTRICULAR DIMENSION IN END DIASTOLE: -0.95
Z-SCORE OF LEFT VENTRICULAR DIMENSION IN END SYSTOLE: -0.08

## 2025-07-02 PROCEDURE — 93306 TTE W/DOPPLER COMPLETE: CPT | Mod: 26,,, | Performed by: INTERNAL MEDICINE

## 2025-07-02 PROCEDURE — 78452 HT MUSCLE IMAGE SPECT MULT: CPT

## 2025-07-02 PROCEDURE — A9502 TC99M TETROFOSMIN: HCPCS | Performed by: NURSE PRACTITIONER

## 2025-07-02 PROCEDURE — 63600175 PHARM REV CODE 636 W HCPCS: Performed by: NURSE PRACTITIONER

## 2025-07-02 PROCEDURE — 93306 TTE W/DOPPLER COMPLETE: CPT

## 2025-07-02 RX ORDER — REGADENOSON 0.08 MG/ML
0.4 INJECTION, SOLUTION INTRAVENOUS
Status: COMPLETED | OUTPATIENT
Start: 2025-07-02 | End: 2025-07-02

## 2025-07-02 RX ADMIN — REGADENOSON 0.4 MG: 0.08 INJECTION, SOLUTION INTRAVENOUS at 08:07

## 2025-07-02 RX ADMIN — TETROFOSMIN 12.5 MILLICURIE: 1.38 INJECTION, POWDER, LYOPHILIZED, FOR SOLUTION INTRAVENOUS at 06:07

## 2025-07-02 RX ADMIN — TETROFOSMIN 27.1 MILLICURIE: 1.38 INJECTION, POWDER, LYOPHILIZED, FOR SOLUTION INTRAVENOUS at 08:07

## 2025-07-03 NOTE — HOSPITAL COURSE
40-year-old woman presents for evaluation of chest pain  and leg swelling .  It was right-sided described as pressure and sharp and no obvious exacerbating or relieving factors.  She has bilateral leg swelling for 3 weeks but it was comes and goes especially standing and resolved lying down ( saw the foot picture took earlier today and very swollen and now near normal )  Also work up was done with PCP for SOB. Never smoker . US leg negative OP and repeat here neg. D dimer was mildly elevated and CTA done as OP and negative for PE and negative CT chest .  She had previous gastric bypass surgery and hysterectomy and gall bladder and appendix removed.   She denies fever chills cough , abdominal pain , nausea or vomiting change in bowel or bladder habits and no recent viral symptoms    She said she took a Lasix at home and this made her feel better asking to prescribe . CXR repeat neg and admitted for cardiac work up and ECHO etc. But overnight she decided to leave AMA despite multiple  counseling to stay for work up and she signed AMA

## 2025-07-03 NOTE — ASSESSMENT & PLAN NOTE
Unclear source of leg edema   BNP is normal . Not obese . CTA neg for PE , possible pul HTN?  ECHO and stress test tomorrow   IV lasix if needed after ECHO   Venous insufficiency is another differential . Albumin is not low and no renal insuffiencey

## 2025-07-03 NOTE — DISCHARGE SUMMARY
[facility] - Emergency Dept  Hospital Medicine  Discharge Summary      Patient Name: Janice Hartmann  MRN: [id number]  AYLA: [id number]  Patient Class: OP- Observation  Admission Date: 6/27/2025  Hospital Length of Stay: 0 days  Discharge Date and Time: 07/02/2025 7:52 PM  Attending Physician: No att. providers found   Discharging Provider: Vitaly Finch MD  Primary Care Provider: Celine Obrien APRN,FNP-C    Primary Care Team: Networked reference to record PCT     HPI:   40-year-old woman presents for evaluation of chest pain  and leg swelling .  It was right-sided described as pressure and sharp and no obvious exacerbating or relieving factors.  She has bilateral leg swelling for 3 weeks but it was comes and goes especially standing and resolved lying down ( saw the foot picture took earlier today and very swollen and now near normal )  Also work up was done with PCP for SOB. Never smoker . US leg negative OP and repeat here neg. D dimer was mildly elevated and CTA done as OP and negative for PE and negative CT chest .  She had previous gastric bypass surgery and hysterectomy and gall bladder and appendix removed.   She denies fever chills cough , abdominal pain , nausea or vomiting change in bowel or bladder habits and no recent viral symptoms    She said she took a Lasix at home and this made her feel better asking to prescribe . CXR repeat neg         * No surgery found *      Hospital Course:   40-year-old woman presents for evaluation of chest pain  and leg swelling .  It was right-sided described as pressure and sharp and no obvious exacerbating or relieving factors.  She has bilateral leg swelling for 3 weeks but it was comes and goes especially standing and resolved lying down ( saw the foot picture took earlier today and very swollen and now near normal )  Also work up was done with PCP for SOB. Never smoker . US leg negative OP and repeat here neg. D dimer was mildly elevated and CTA  "done as OP and negative for PE and negative CT chest .  She had previous gastric bypass surgery and hysterectomy and gall bladder and appendix removed.   She denies fever chills cough , abdominal pain , nausea or vomiting change in bowel or bladder habits and no recent viral symptoms    She said she took a Lasix at home and this made her feel better asking to prescribe . CXR repeat neg and admitted for cardiac work up and ECHO etc. But overnight she decided to leave AMA despite multiple  counseling to stay for work up and she signed AMA        Goals of Care Treatment Preferences:  Code Status: Full Code         Consults:     Assessment & Plan  Diet-controlled gestational diabetes mellitus  a1c    SONIA on CPAP  R/o pul HTN   ECHO   Patient is not obese anymore after bypass surgery and may need to re eval for CPAP use    Anxiety and depression  Aware  Home meds  S/P bariatric surgery  aware    Chest pain  Trop neg  Repeat one more   ECHO   Stress test in am   Aspirin  A1c and lipid panal       r/o Heart failure  Unclear source of leg edema   BNP is normal . Not obese . CTA neg for PE , possible pul HTN?  ECHO and stress test tomorrow   IV lasix if needed after ECHO   Venous insufficiency is another differential . Albumin is not low and no renal insuffiencey     Final Active Diagnoses:    Diagnosis Date Noted POA    Chest pain [R07.9] 06/28/2025 Unknown    r/o Heart failure [I50.9] 06/28/2025 Unknown    S/P bariatric surgery [Z98.84] 03/09/2021 Not Applicable    Anxiety and depression [F41.9, F32.A] 12/02/2019 Yes    SONIA on CPAP [G47.33] 03/06/2018 Yes    Diet-controlled gestational diabetes mellitus [O24.410] 12/16/2015 Yes      Problems Resolved During this Admission:       Discharged Condition: good    Disposition: Left Against Medical Adv*    Follow Up:    Patient Instructions:   No discharge procedures on file.    Significant Diagnostic Studies: Labs: CMP No results for input(s): "NA", "K", "CL", "CO2", "GLU", " ""BUN", "CREATININE", "CALCIUM", "PROT", "ALBUMIN", "BILITOT", "ALKPHOS", "AST", "ALT", "ANIONGAP", "ESTGFRAFRICA", "EGFRNONAA" in the last 48 hours. and CBC No results for input(s): "WBC", "HGB", "HCT", "PLT" in the last 48 hours.    Pending Diagnostic Studies:       Procedure Component Value Units Date/Time    EXTRA TUBES [[id number]] Collected: 06/27/25 2210    Order Status: Sent Lab Status: In process Updated: 06/27/25 2216    Specimen: Blood, Venous     Narrative:      The following orders were created for panel order EXTRA TUBES.  Procedure                               Abnormality         Status                     ---------                               -----------         ------                     Light Green Top Hold[[id number]]                            In process                   Please view results for these tests on the individual orders.           Medications:  Reconciled Home Medications:      Medication List        ASK your doctor about these medications      cyanocobalamin (vitamin B-12) 2,000 mcg Tab  Take 1 tablet by mouth once daily.     ergocalciferol 50,000 unit Cap  Commonly known as: ERGOCALCIFEROL  Take 1 capsule (50,000 Units total) by mouth twice a week.     folic acid 1 MG tablet  Commonly known as: FOLVITE  Take 1 tablet (1,000 mcg total) by mouth once daily.     pregabalin 200 MG Cap  Commonly known as: LYRICA  Take 1 capsule (200 mg total) by mouth 3 (three) times daily.              Indwelling Lines/Drains at time of discharge:   Lines/Drains/Airways       None                 Patient did not wait for examination       Time spent on the discharge of patient: 22 minutes         Vitaly Finch MD  Department of Hospital Medicine  [facility] - Emergency Dept  "

## 2025-07-07 ENCOUNTER — TELEPHONE (OUTPATIENT)
Dept: CARDIOLOGY | Facility: CLINIC | Age: 40
End: 2025-07-07
Payer: COMMERCIAL

## 2025-07-07 NOTE — TELEPHONE ENCOUNTER
Copied from CRM #[id number]. Topic: General Inquiry - Patient Advice  >> Jul 7, 2025 11:35 AM Clarissa wrote:  Type:  Needs Medical Advice    Who Called: Patient   Would the patient rather a call back or a response via [facility]? Call   Best Call Back Number: [phone]  Additional Information: Please call pt regarding abnormal stress test. Thanks!

## 2025-07-09 ENCOUNTER — TELEPHONE (OUTPATIENT)
Dept: CARDIOLOGY | Facility: CLINIC | Age: 40
End: 2025-07-09
Payer: COMMERCIAL

## 2025-07-09 DIAGNOSIS — R94.39 ABNORMAL STRESS TEST: ICD-10-CM

## 2025-07-09 DIAGNOSIS — R07.9 CHEST PAIN, UNSPECIFIED TYPE: Primary | ICD-10-CM

## 2025-07-09 RX ORDER — SODIUM CHLORIDE 9 MG/ML
INJECTION, SOLUTION INTRAVENOUS ONCE
OUTPATIENT
Start: 2025-07-09 | End: 2025-07-09

## 2025-07-09 RX ORDER — DIPHENHYDRAMINE HCL 50 MG
50 CAPSULE ORAL
OUTPATIENT
Start: 2025-07-09

## 2025-07-09 NOTE — TELEPHONE ENCOUNTER
Dr Lugo is okay , he will put in the orders for the angiogram . This patient is aware .         Copied from CRM #[id number]. Topic: General Inquiry - Patient Advice  >> Jul 9, 2025 11:44 AM Clarissa wrote:  Type:  Needs Medical Advice    Who Called: Patient     Would the patient rather a call back or a response via [facility]? Call    Best Call Back Number: [phone]    Additional Information: Pt is calling back regarding getting an angiogram done as she states she had an abnormal stress test. Please call pt at number listed. Thanks!

## 2025-07-16 ENCOUNTER — TELEPHONE (OUTPATIENT)
Dept: CARDIOLOGY | Facility: CLINIC | Age: 40
End: 2025-07-16
Payer: COMMERCIAL

## 2025-07-16 ENCOUNTER — PATIENT MESSAGE (OUTPATIENT)
Dept: FAMILY MEDICINE | Facility: CLINIC | Age: 40
End: 2025-07-16
Payer: COMMERCIAL

## 2025-07-22 ENCOUNTER — HOSPITAL ENCOUNTER (OUTPATIENT)
Dept: PREADMISSION TESTING | Facility: HOSPITAL | Age: 40
Discharge: HOME | End: 2025-07-22
Attending: INTERNAL MEDICINE
Payer: COMMERCIAL

## 2025-07-22 VITALS
TEMPERATURE: 98 F | RESPIRATION RATE: 14 BRPM | HEART RATE: 68 BPM | SYSTOLIC BLOOD PRESSURE: 113 MMHG | DIASTOLIC BLOOD PRESSURE: 76 MMHG | OXYGEN SATURATION: 98 %

## 2025-07-22 DIAGNOSIS — R07.9 CHEST PAIN, UNSPECIFIED TYPE: ICD-10-CM

## 2025-07-22 DIAGNOSIS — R07.89 OTHER CHEST PAIN: ICD-10-CM

## 2025-07-22 DIAGNOSIS — R94.39 ABNORMAL STRESS TEST: ICD-10-CM

## 2025-07-22 LAB
ABSOLUTE EOSINOPHIL (SMH): 0.04 K/UL
ABSOLUTE MONOCYTE (SMH): 0.38 K/UL (ref 0.3–1)
ABSOLUTE NEUTROPHIL COUNT (SMH): 3.3 K/UL (ref 1.8–7.7)
ANION GAP (SMH): 5 MMOL/L (ref 8–16)
BASOPHILS # BLD AUTO: 0.04 K/UL
BASOPHILS NFR BLD AUTO: 0.6 %
BUN SERPL-MCNC: 14 MG/DL (ref 6–20)
CALCIUM SERPL-MCNC: 9.4 MG/DL (ref 8.7–10.5)
CHLORIDE SERPL-SCNC: 106 MMOL/L (ref 95–110)
CO2 SERPL-SCNC: 28 MMOL/L (ref 23–29)
CREAT SERPL-MCNC: 0.8 MG/DL (ref 0.5–1.4)
ERYTHROCYTE [DISTWIDTH] IN BLOOD BY AUTOMATED COUNT: 12.8 % (ref 11.5–14.5)
GFR SERPLBLD CREATININE-BSD FMLA CKD-EPI: >60 ML/MIN/1.73/M2
GLUCOSE SERPL-MCNC: 82 MG/DL (ref 70–110)
HCT VFR BLD AUTO: 38.8 % (ref 37–48.5)
HGB BLD-MCNC: 13.1 GM/DL (ref 12–16)
IMM GRANULOCYTES # BLD AUTO: 0.01 K/UL (ref 0–0.04)
IMM GRANULOCYTES NFR BLD AUTO: 0.2 % (ref 0–0.5)
LYMPHOCYTES # BLD AUTO: 2.55 K/UL (ref 1–4.8)
MCH RBC QN AUTO: 31.4 PG (ref 27–31)
MCHC RBC AUTO-ENTMCNC: 33.8 G/DL (ref 32–36)
MCV RBC AUTO: 93 FL (ref 82–98)
NUCLEATED RBC (/100WBC) (SMH): 0 /100 WBC
PLATELET # BLD AUTO: 184 K/UL (ref 150–450)
PMV BLD AUTO: 11.7 FL (ref 9.2–12.9)
POTASSIUM SERPL-SCNC: 4.7 MMOL/L (ref 3.5–5.1)
RBC # BLD AUTO: 4.17 M/UL (ref 4–5.4)
RELATIVE EOSINOPHIL (SMH): 0.6 % (ref 0–8)
RELATIVE LYMPHOCYTE (SMH): 40.3 % (ref 18–48)
RELATIVE MONOCYTE (SMH): 6 % (ref 4–15)
RELATIVE NEUTROPHIL (SMH): 52.3 % (ref 38–73)
SODIUM SERPL-SCNC: 139 MMOL/L (ref 136–145)
WBC # BLD AUTO: 6.32 K/UL (ref 3.9–12.7)

## 2025-07-22 PROCEDURE — 93010 ELECTROCARDIOGRAM REPORT: CPT | Mod: ,,, | Performed by: INTERNAL MEDICINE

## 2025-07-22 PROCEDURE — 36415 COLL VENOUS BLD VENIPUNCTURE: CPT | Performed by: INTERNAL MEDICINE

## 2025-07-22 PROCEDURE — 93005 ELECTROCARDIOGRAM TRACING: CPT | Performed by: INTERNAL MEDICINE

## 2025-07-22 PROCEDURE — 85025 COMPLETE CBC W/AUTO DIFF WBC: CPT | Performed by: INTERNAL MEDICINE

## 2025-07-22 PROCEDURE — 80048 BASIC METABOLIC PNL TOTAL CA: CPT | Performed by: INTERNAL MEDICINE

## 2025-07-22 NOTE — DISCHARGE INSTRUCTIONS
Your procedure is scheduled for: Thursday July 24th          [facility] thru [facility] entrance at: 0700    Nothing to eat  after midnight the night before your procedure. You may have clear liquids up to 2 hours before coming in for procedure. This includes water, Gatorade, clear juice  Do not take any medications the morning of your procedure  Bring all your medications with you in the original pill bottles from pharmacy. Please bring your ASPIRIN   If you take blood thinners, ask your doctor if you should stop taking them.  Do not take metformin 24 hours prior to your procedure.  Adjust your insulin or other diabetes medications if needed.   Do your chlorhexidine wash the night before and morning of your procedure.  If you use a CPAP or BiPAP at home, please bring it with you the day of your procedure.  Make arrangements for someone you know to drive you home after your procedure. Taxi and Uber are not acceptable.  Come dressed comfortably          Any questions call the [facility] at [phone]

## 2025-07-23 LAB
OHS QRS DURATION: 74 MS
OHS QTC CALCULATION: 397 MS

## 2025-07-24 ENCOUNTER — HOSPITAL ENCOUNTER (OUTPATIENT)
Facility: HOSPITAL | Age: 40
Discharge: HOME | End: 2025-07-24
Attending: INTERNAL MEDICINE | Admitting: INTERNAL MEDICINE
Payer: COMMERCIAL

## 2025-07-24 VITALS
DIASTOLIC BLOOD PRESSURE: 55 MMHG | RESPIRATION RATE: 12 BRPM | BODY MASS INDEX: 24.41 KG/M2 | SYSTOLIC BLOOD PRESSURE: 102 MMHG | OXYGEN SATURATION: 100 % | HEIGHT: 64 IN | WEIGHT: 143 LBS | HEART RATE: 60 BPM

## 2025-07-24 DIAGNOSIS — I25.10 CAD (CORONARY ARTERY DISEASE): ICD-10-CM

## 2025-07-24 DIAGNOSIS — R07.9 CHEST PAIN, UNSPECIFIED TYPE: ICD-10-CM

## 2025-07-24 DIAGNOSIS — R94.39 ABNORMAL STRESS TEST: ICD-10-CM

## 2025-07-24 LAB
B-HCG UR QL: NEGATIVE
OHS CV CPX PATIENT HEIGHT IN: 64

## 2025-07-24 PROCEDURE — 99152 MOD SED SAME PHYS/QHP 5/>YRS: CPT | Mod: ,,, | Performed by: INTERNAL MEDICINE

## 2025-07-24 PROCEDURE — 81025 URINE PREGNANCY TEST: CPT | Performed by: INTERNAL MEDICINE

## 2025-07-24 PROCEDURE — 25500020 PHARM REV CODE 255: Performed by: INTERNAL MEDICINE

## 2025-07-24 PROCEDURE — 93458 L HRT ARTERY/VENTRICLE ANGIO: CPT | Performed by: INTERNAL MEDICINE

## 2025-07-24 PROCEDURE — 25000003 PHARM REV CODE 250: Performed by: INTERNAL MEDICINE

## 2025-07-24 PROCEDURE — 63600175 PHARM REV CODE 636 W HCPCS: Performed by: INTERNAL MEDICINE

## 2025-07-24 PROCEDURE — 93458 L HRT ARTERY/VENTRICLE ANGIO: CPT | Mod: 26,,, | Performed by: INTERNAL MEDICINE

## 2025-07-24 RX ORDER — FENTANYL CITRATE 50 UG/ML
INJECTION, SOLUTION INTRAMUSCULAR; INTRAVENOUS
Status: DISCONTINUED | OUTPATIENT
Start: 2025-07-24 | End: 2025-07-24 | Stop reason: HOSPADM

## 2025-07-24 RX ORDER — ACETAMINOPHEN 325 MG/1
650 TABLET ORAL EVERY 4 HOURS PRN
Status: DISCONTINUED | OUTPATIENT
Start: 2025-07-24 | End: 2025-07-24 | Stop reason: HOSPADM

## 2025-07-24 RX ORDER — DIPHENHYDRAMINE HCL 25 MG
CAPSULE ORAL
Status: DISCONTINUED
Start: 2025-07-24 | End: 2025-07-24 | Stop reason: HOSPADM

## 2025-07-24 RX ORDER — HEPARIN SODIUM 10000 [USP'U]/ML
INJECTION, SOLUTION INTRAVENOUS; SUBCUTANEOUS
Status: DISCONTINUED | OUTPATIENT
Start: 2025-07-24 | End: 2025-07-24 | Stop reason: HOSPADM

## 2025-07-24 RX ORDER — SODIUM CHLORIDE 9 MG/ML
INJECTION, SOLUTION INTRAVENOUS
Status: DISCONTINUED | OUTPATIENT
Start: 2025-07-24 | End: 2025-07-24 | Stop reason: HOSPADM

## 2025-07-24 RX ORDER — SODIUM CHLORIDE 9 MG/ML
INJECTION, SOLUTION INTRAVENOUS ONCE
Status: COMPLETED | OUTPATIENT
Start: 2025-07-24 | End: 2025-07-24

## 2025-07-24 RX ORDER — ONDANSETRON 4 MG/1
8 TABLET, ORALLY DISINTEGRATING ORAL EVERY 8 HOURS PRN
Status: DISCONTINUED | OUTPATIENT
Start: 2025-07-24 | End: 2025-07-24 | Stop reason: HOSPADM

## 2025-07-24 RX ORDER — LIDOCAINE HYDROCHLORIDE 10 MG/ML
INJECTION, SOLUTION INFILTRATION; PERINEURAL
Status: DISCONTINUED | OUTPATIENT
Start: 2025-07-24 | End: 2025-07-24 | Stop reason: HOSPADM

## 2025-07-24 RX ORDER — MIDAZOLAM HYDROCHLORIDE 1 MG/ML
INJECTION INTRAMUSCULAR; INTRAVENOUS
Status: DISCONTINUED | OUTPATIENT
Start: 2025-07-24 | End: 2025-07-24 | Stop reason: HOSPADM

## 2025-07-24 RX ORDER — NITROGLYCERIN 5 MG/ML
INJECTION, SOLUTION INTRAVENOUS
Status: DISCONTINUED | OUTPATIENT
Start: 2025-07-24 | End: 2025-07-24 | Stop reason: HOSPADM

## 2025-07-24 RX ORDER — DIPHENHYDRAMINE HCL 25 MG
50 CAPSULE ORAL
Status: DISCONTINUED | OUTPATIENT
Start: 2025-07-24 | End: 2025-07-24 | Stop reason: HOSPADM

## 2025-07-24 RX ADMIN — SODIUM CHLORIDE: 9 INJECTION, SOLUTION INTRAVENOUS at 07:07

## 2025-07-24 RX ADMIN — DIPHENHYDRAMINE HYDROCHLORIDE 50 MG: 25 CAPSULE ORAL at 07:07

## 2025-07-24 NOTE — Clinical Note
40 ml of contrast were injected throughout the case. 20 mL of contrast was the total wasted during the case. 60 mL was the total amount used during the case.